# Patient Record
Sex: MALE | Race: WHITE | NOT HISPANIC OR LATINO | Employment: OTHER | URBAN - METROPOLITAN AREA
[De-identification: names, ages, dates, MRNs, and addresses within clinical notes are randomized per-mention and may not be internally consistent; named-entity substitution may affect disease eponyms.]

---

## 2021-01-01 ENCOUNTER — APPOINTMENT (OUTPATIENT)
Dept: OTHER | Facility: HOSPITAL | Age: 86
End: 2021-01-01

## 2021-01-01 ENCOUNTER — ANESTHESIA (OUTPATIENT)
Dept: PERIOP | Facility: HOSPITAL | Age: 86
End: 2021-01-01

## 2021-01-01 ENCOUNTER — APPOINTMENT (OUTPATIENT)
Dept: GENERAL RADIOLOGY | Facility: HOSPITAL | Age: 86
End: 2021-01-01

## 2021-01-01 ENCOUNTER — HOSPITAL ENCOUNTER (INPATIENT)
Facility: HOSPITAL | Age: 86
LOS: 7 days | Discharge: HOME | End: 2021-05-08
Attending: HOSPITALIST | Admitting: HOSPITALIST

## 2021-01-01 ENCOUNTER — READMISSION MANAGEMENT (OUTPATIENT)
Dept: CALL CENTER | Facility: HOSPITAL | Age: 86
End: 2021-01-01

## 2021-01-01 ENCOUNTER — HOSPITAL ENCOUNTER (INPATIENT)
Facility: HOSPITAL | Age: 86
LOS: 3 days | End: 2021-08-24
Attending: INTERNAL MEDICINE | Admitting: HOSPITALIST

## 2021-01-01 ENCOUNTER — APPOINTMENT (OUTPATIENT)
Dept: ULTRASOUND IMAGING | Facility: HOSPITAL | Age: 86
End: 2021-01-01

## 2021-01-01 ENCOUNTER — APPOINTMENT (OUTPATIENT)
Dept: CARDIOLOGY | Facility: HOSPITAL | Age: 86
End: 2021-01-01

## 2021-01-01 ENCOUNTER — ANESTHESIA EVENT (OUTPATIENT)
Dept: PERIOP | Facility: HOSPITAL | Age: 86
End: 2021-01-01

## 2021-01-01 VITALS
DIASTOLIC BLOOD PRESSURE: 92 MMHG | HEART RATE: 86 BPM | TEMPERATURE: 98.8 F | BODY MASS INDEX: 19.72 KG/M2 | RESPIRATION RATE: 17 BRPM | WEIGHT: 133.16 LBS | SYSTOLIC BLOOD PRESSURE: 162 MMHG | OXYGEN SATURATION: 91 % | HEIGHT: 69 IN

## 2021-01-01 VITALS
TEMPERATURE: 97.2 F | DIASTOLIC BLOOD PRESSURE: 87 MMHG | RESPIRATION RATE: 20 BRPM | OXYGEN SATURATION: 99 % | BODY MASS INDEX: 17.59 KG/M2 | HEIGHT: 72 IN | HEART RATE: 74 BPM | SYSTOLIC BLOOD PRESSURE: 122 MMHG | WEIGHT: 129.85 LBS

## 2021-01-01 DIAGNOSIS — Z09 FOLLOW UP: ICD-10-CM

## 2021-01-01 DIAGNOSIS — J15.8 PNEUMONIA DUE TO OTHER SPECIFIED BACTERIA (HCC): Primary | ICD-10-CM

## 2021-01-01 LAB
ALBUMIN SERPL-MCNC: 3.6 G/DL (ref 3.5–5.2)
ALBUMIN SERPL-MCNC: 4.1 G/DL (ref 3.5–5.2)
ALBUMIN/GLOB SERPL: 1.2 G/DL
ALP SERPL-CCNC: 97 U/L (ref 39–117)
ALT SERPL W P-5'-P-CCNC: 10 U/L (ref 1–41)
ANION GAP SERPL CALCULATED.3IONS-SCNC: 10 MMOL/L (ref 5–15)
ANION GAP SERPL CALCULATED.3IONS-SCNC: 10 MMOL/L (ref 5–15)
ANION GAP SERPL CALCULATED.3IONS-SCNC: 11 MMOL/L (ref 5–15)
ANION GAP SERPL CALCULATED.3IONS-SCNC: 12 MMOL/L (ref 5–15)
ANION GAP SERPL CALCULATED.3IONS-SCNC: 12 MMOL/L (ref 5–15)
ANION GAP SERPL CALCULATED.3IONS-SCNC: 13 MMOL/L (ref 5–15)
ANION GAP SERPL CALCULATED.3IONS-SCNC: 14 MMOL/L (ref 5–15)
ANION GAP SERPL CALCULATED.3IONS-SCNC: 14 MMOL/L (ref 5–15)
ANION GAP SERPL CALCULATED.3IONS-SCNC: 15 MMOL/L (ref 5–15)
ARTERIAL PATENCY WRIST A: POSITIVE
AST SERPL-CCNC: 20 U/L (ref 1–40)
ATMOSPHERIC PRESS: ABNORMAL MM[HG]
BACTERIA SPEC AEROBE CULT: NORMAL
BACTERIA UR QL AUTO: ABNORMAL /HPF
BASE EXCESS BLDA CALC-SCNC: -3 MMOL/L (ref 0–3)
BASOPHILS # BLD AUTO: 0 10*3/MM3 (ref 0–0.2)
BASOPHILS # BLD AUTO: 0.1 10*3/MM3 (ref 0–0.2)
BASOPHILS NFR BLD AUTO: 0.2 % (ref 0–1.5)
BASOPHILS NFR BLD AUTO: 0.8 % (ref 0–1.5)
BASOPHILS NFR BLD AUTO: 0.9 % (ref 0–1.5)
BASOPHILS NFR BLD AUTO: 0.9 % (ref 0–1.5)
BDY SITE: ABNORMAL
BH CV ECHO MEAS - AO ROOT AREA (BSA CORRECTED): 1.8
BH CV ECHO MEAS - AO ROOT AREA: 8.6 CM^2
BH CV ECHO MEAS - AO ROOT DIAM: 3.3 CM
BH CV ECHO MEAS - BSA(HAYCOCK): 1.8 M^2
BH CV ECHO MEAS - BSA: 1.8 M^2
BH CV ECHO MEAS - BZI_BMI: 18.9 KILOGRAMS/M^2
BH CV ECHO MEAS - BZI_METRIC_HEIGHT: 182.9 CM
BH CV ECHO MEAS - BZI_METRIC_WEIGHT: 63 KG
BH CV ECHO MEAS - EDV(CUBED): 44 ML
BH CV ECHO MEAS - EDV(MOD-SP4): 72 ML
BH CV ECHO MEAS - EDV(TEICH): 51.9 ML
BH CV ECHO MEAS - EF(CUBED): 74.7 %
BH CV ECHO MEAS - EF(MOD-BP): 46 %
BH CV ECHO MEAS - EF(MOD-SP4): 46.7 %
BH CV ECHO MEAS - EF(TEICH): 67.6 %
BH CV ECHO MEAS - ESV(CUBED): 11.1 ML
BH CV ECHO MEAS - ESV(MOD-SP4): 38.4 ML
BH CV ECHO MEAS - ESV(TEICH): 16.8 ML
BH CV ECHO MEAS - FS: 36.8 %
BH CV ECHO MEAS - IVS/LVPW: 0.77
BH CV ECHO MEAS - IVSD: 0.82 CM
BH CV ECHO MEAS - LA DIMENSION(2D): 3.1 CM
BH CV ECHO MEAS - LV DIASTOLIC VOL/BSA (35-75): 39.5 ML/M^2
BH CV ECHO MEAS - LV MASS(C)D: 95.2 GRAMS
BH CV ECHO MEAS - LV MASS(C)DI: 52.2 GRAMS/M^2
BH CV ECHO MEAS - LV SYSTOLIC VOL/BSA (12-30): 21 ML/M^2
BH CV ECHO MEAS - LVIDD: 3.5 CM
BH CV ECHO MEAS - LVIDS: 2.2 CM
BH CV ECHO MEAS - LVOT AREA: 2.3 CM^2
BH CV ECHO MEAS - LVOT DIAM: 1.7 CM
BH CV ECHO MEAS - LVPWD: 1.1 CM
BH CV ECHO MEAS - SI(CUBED): 18 ML/M^2
BH CV ECHO MEAS - SI(MOD-SP4): 18.4 ML/M^2
BH CV ECHO MEAS - SI(TEICH): 19.2 ML/M^2
BH CV ECHO MEAS - SV(CUBED): 32.9 ML
BH CV ECHO MEAS - SV(MOD-SP4): 33.6 ML
BH CV ECHO MEAS - SV(TEICH): 35.1 ML
BILIRUB SERPL-MCNC: 0.9 MG/DL (ref 0–1.2)
BILIRUB UR QL STRIP: NEGATIVE
BUN SERPL-MCNC: 16 MG/DL (ref 8–23)
BUN SERPL-MCNC: 17 MG/DL (ref 8–23)
BUN SERPL-MCNC: 20 MG/DL (ref 8–23)
BUN SERPL-MCNC: 31 MG/DL (ref 8–23)
BUN SERPL-MCNC: 32 MG/DL (ref 8–23)
BUN SERPL-MCNC: 37 MG/DL (ref 8–23)
BUN SERPL-MCNC: 40 MG/DL (ref 8–23)
BUN SERPL-MCNC: 44 MG/DL (ref 8–23)
BUN SERPL-MCNC: 46 MG/DL (ref 8–23)
BUN/CREAT SERPL: 16 (ref 7–25)
BUN/CREAT SERPL: 16.8 (ref 7–25)
BUN/CREAT SERPL: 19 (ref 7–25)
BUN/CREAT SERPL: 21.2 (ref 7–25)
BUN/CREAT SERPL: 26.4 (ref 7–25)
BUN/CREAT SERPL: 33.3 (ref 7–25)
BUN/CREAT SERPL: 35.4 (ref 7–25)
BUN/CREAT SERPL: 38.1 (ref 7–25)
BUN/CREAT SERPL: 38.7 (ref 7–25)
CA-I SERPL ISE-MCNC: 1.22 MMOL/L (ref 1.2–1.3)
CALCIUM SPEC-SCNC: 8.1 MG/DL (ref 8.6–10.5)
CALCIUM SPEC-SCNC: 8.4 MG/DL (ref 8.6–10.5)
CALCIUM SPEC-SCNC: 9.2 MG/DL (ref 8.6–10.5)
CALCIUM SPEC-SCNC: 9.2 MG/DL (ref 8.6–10.5)
CALCIUM SPEC-SCNC: 9.3 MG/DL (ref 8.6–10.5)
CALCIUM SPEC-SCNC: 9.3 MG/DL (ref 8.6–10.5)
CALCIUM SPEC-SCNC: 9.4 MG/DL (ref 8.6–10.5)
CALCIUM SPEC-SCNC: 9.4 MG/DL (ref 8.6–10.5)
CALCIUM SPEC-SCNC: 9.7 MG/DL (ref 8.6–10.5)
CHLORIDE SERPL-SCNC: 100 MMOL/L (ref 98–107)
CHLORIDE SERPL-SCNC: 101 MMOL/L (ref 98–107)
CHLORIDE SERPL-SCNC: 101 MMOL/L (ref 98–107)
CHLORIDE SERPL-SCNC: 103 MMOL/L (ref 98–107)
CHLORIDE SERPL-SCNC: 103 MMOL/L (ref 98–107)
CHLORIDE SERPL-SCNC: 104 MMOL/L (ref 98–107)
CHLORIDE SERPL-SCNC: 107 MMOL/L (ref 98–107)
CHLORIDE SERPL-SCNC: 98 MMOL/L (ref 98–107)
CHLORIDE SERPL-SCNC: 98 MMOL/L (ref 98–107)
CHOLEST SERPL-MCNC: 137 MG/DL (ref 0–200)
CLARITY UR: CLEAR
CO2 BLDA-SCNC: 24.4 MMOL/L (ref 22–29)
CO2 SERPL-SCNC: 23 MMOL/L (ref 22–29)
CO2 SERPL-SCNC: 24 MMOL/L (ref 22–29)
CO2 SERPL-SCNC: 24 MMOL/L (ref 22–29)
CO2 SERPL-SCNC: 25 MMOL/L (ref 22–29)
CO2 SERPL-SCNC: 25 MMOL/L (ref 22–29)
CO2 SERPL-SCNC: 26 MMOL/L (ref 22–29)
CO2 SERPL-SCNC: 27 MMOL/L (ref 22–29)
CO2 SERPL-SCNC: 27 MMOL/L (ref 22–29)
CO2 SERPL-SCNC: 29 MMOL/L (ref 22–29)
COLOR UR: ABNORMAL
CREAT SERPL-MCNC: 0.97 MG/DL (ref 0.76–1.27)
CREAT SERPL-MCNC: 1 MG/DL (ref 0.76–1.27)
CREAT SERPL-MCNC: 1.01 MG/DL (ref 0.76–1.27)
CREAT SERPL-MCNC: 1.05 MG/DL (ref 0.76–1.27)
CREAT SERPL-MCNC: 1.13 MG/DL (ref 0.76–1.27)
CREAT SERPL-MCNC: 1.19 MG/DL (ref 0.76–1.27)
CREAT SERPL-MCNC: 1.21 MG/DL (ref 0.76–1.27)
CREAT SERPL-MCNC: 1.32 MG/DL (ref 0.76–1.27)
CREAT SERPL-MCNC: 1.46 MG/DL (ref 0.76–1.27)
CRP SERPL-MCNC: 8.91 MG/DL (ref 0–0.5)
D DIMER PPP FEU-MCNC: 1.5 MG/L (FEU) (ref 0–0.59)
D DIMER PPP FEU-MCNC: 1.66 MG/L (FEU) (ref 0–0.59)
D-LACTATE SERPL-SCNC: 1.6 MMOL/L (ref 0.5–2)
D-LACTATE SERPL-SCNC: 1.7 MMOL/L (ref 0.5–2)
DEPRECATED RDW RBC AUTO: 40.3 FL (ref 37–54)
DEPRECATED RDW RBC AUTO: 40.7 FL (ref 37–54)
DEPRECATED RDW RBC AUTO: 48.6 FL (ref 37–54)
DEPRECATED RDW RBC AUTO: 50.3 FL (ref 37–54)
EOSINOPHIL # BLD AUTO: 0 10*3/MM3 (ref 0–0.4)
EOSINOPHIL # BLD AUTO: 0.1 10*3/MM3 (ref 0–0.4)
EOSINOPHIL NFR BLD AUTO: 0 % (ref 0.3–6.2)
EOSINOPHIL NFR BLD AUTO: 0.1 % (ref 0.3–6.2)
EOSINOPHIL NFR BLD AUTO: 0.2 % (ref 0.3–6.2)
EOSINOPHIL NFR BLD AUTO: 0.5 % (ref 0.3–6.2)
ERYTHROCYTE [DISTWIDTH] IN BLOOD BY AUTOMATED COUNT: 13.6 % (ref 12.3–15.4)
ERYTHROCYTE [DISTWIDTH] IN BLOOD BY AUTOMATED COUNT: 13.6 % (ref 12.3–15.4)
ERYTHROCYTE [DISTWIDTH] IN BLOOD BY AUTOMATED COUNT: 15.9 % (ref 12.3–15.4)
ERYTHROCYTE [DISTWIDTH] IN BLOOD BY AUTOMATED COUNT: 16.2 % (ref 12.3–15.4)
GFR SERPL CREATININE-BSD FRML MDRD: 46 ML/MIN/1.73
GFR SERPL CREATININE-BSD FRML MDRD: 52 ML/MIN/1.73
GFR SERPL CREATININE-BSD FRML MDRD: 57 ML/MIN/1.73
GFR SERPL CREATININE-BSD FRML MDRD: 58 ML/MIN/1.73
GFR SERPL CREATININE-BSD FRML MDRD: 62 ML/MIN/1.73
GFR SERPL CREATININE-BSD FRML MDRD: 67 ML/MIN/1.73
GFR SERPL CREATININE-BSD FRML MDRD: 70 ML/MIN/1.73
GFR SERPL CREATININE-BSD FRML MDRD: 71 ML/MIN/1.73
GFR SERPL CREATININE-BSD FRML MDRD: 74 ML/MIN/1.73
GLOBULIN UR ELPH-MCNC: 3.4 GM/DL
GLUCOSE BLDC GLUCOMTR-MCNC: 117 MG/DL (ref 70–105)
GLUCOSE BLDC GLUCOMTR-MCNC: 120 MG/DL (ref 70–105)
GLUCOSE BLDC GLUCOMTR-MCNC: 127 MG/DL (ref 70–105)
GLUCOSE BLDC GLUCOMTR-MCNC: 127 MG/DL (ref 70–105)
GLUCOSE BLDC GLUCOMTR-MCNC: 131 MG/DL (ref 70–105)
GLUCOSE BLDC GLUCOMTR-MCNC: 133 MG/DL (ref 70–105)
GLUCOSE BLDC GLUCOMTR-MCNC: 134 MG/DL (ref 70–105)
GLUCOSE BLDC GLUCOMTR-MCNC: 135 MG/DL (ref 70–105)
GLUCOSE BLDC GLUCOMTR-MCNC: 136 MG/DL (ref 70–105)
GLUCOSE BLDC GLUCOMTR-MCNC: 139 MG/DL (ref 70–105)
GLUCOSE BLDC GLUCOMTR-MCNC: 144 MG/DL (ref 70–105)
GLUCOSE BLDC GLUCOMTR-MCNC: 147 MG/DL (ref 70–105)
GLUCOSE BLDC GLUCOMTR-MCNC: 150 MG/DL (ref 70–105)
GLUCOSE BLDC GLUCOMTR-MCNC: 155 MG/DL (ref 70–105)
GLUCOSE BLDC GLUCOMTR-MCNC: 157 MG/DL (ref 70–105)
GLUCOSE BLDC GLUCOMTR-MCNC: 165 MG/DL (ref 70–105)
GLUCOSE BLDC GLUCOMTR-MCNC: 167 MG/DL (ref 70–105)
GLUCOSE BLDC GLUCOMTR-MCNC: 170 MG/DL (ref 70–105)
GLUCOSE BLDC GLUCOMTR-MCNC: 186 MG/DL (ref 70–105)
GLUCOSE BLDC GLUCOMTR-MCNC: 186 MG/DL (ref 70–105)
GLUCOSE BLDC GLUCOMTR-MCNC: 193 MG/DL (ref 70–105)
GLUCOSE BLDC GLUCOMTR-MCNC: 200 MG/DL (ref 70–105)
GLUCOSE BLDC GLUCOMTR-MCNC: 224 MG/DL (ref 70–105)
GLUCOSE SERPL-MCNC: 122 MG/DL (ref 65–99)
GLUCOSE SERPL-MCNC: 133 MG/DL (ref 65–99)
GLUCOSE SERPL-MCNC: 141 MG/DL (ref 65–99)
GLUCOSE SERPL-MCNC: 144 MG/DL (ref 65–99)
GLUCOSE SERPL-MCNC: 152 MG/DL (ref 65–99)
GLUCOSE SERPL-MCNC: 157 MG/DL (ref 65–99)
GLUCOSE SERPL-MCNC: 162 MG/DL (ref 65–99)
GLUCOSE SERPL-MCNC: 164 MG/DL (ref 65–99)
GLUCOSE SERPL-MCNC: 208 MG/DL (ref 65–99)
GLUCOSE UR STRIP-MCNC: NEGATIVE MG/DL
HBA1C MFR BLD: 5.5 % (ref 3.5–5.6)
HCO3 BLDA-SCNC: 23.1 MMOL/L (ref 21–28)
HCT VFR BLD AUTO: 34 % (ref 37.5–51)
HCT VFR BLD AUTO: 34.9 % (ref 37.5–51)
HCT VFR BLD AUTO: 42 % (ref 37.5–51)
HCT VFR BLD AUTO: 42.1 % (ref 37.5–51)
HDLC SERPL-MCNC: 54 MG/DL (ref 40–60)
HEMODILUTION: NO
HGB BLD-MCNC: 11.5 G/DL (ref 13–17.7)
HGB BLD-MCNC: 11.9 G/DL (ref 13–17.7)
HGB BLD-MCNC: 14.4 G/DL (ref 13–17.7)
HGB BLD-MCNC: 14.7 G/DL (ref 13–17.7)
HGB UR QL STRIP.AUTO: NEGATIVE
HYALINE CASTS UR QL AUTO: ABNORMAL /LPF
INHALED O2 CONCENTRATION: 100 %
INR PPP: 1.01 (ref 0.93–1.1)
INR PPP: 1.05 (ref 0.93–1.1)
INR PPP: 1.07 (ref 0.93–1.1)
KETONES UR QL STRIP: ABNORMAL
LDLC SERPL CALC-MCNC: 70 MG/DL (ref 0–100)
LDLC/HDLC SERPL: 1.32 {RATIO}
LEUKOCYTE ESTERASE UR QL STRIP.AUTO: NEGATIVE
LYMPHOCYTES # BLD AUTO: 0.7 10*3/MM3 (ref 0.7–3.1)
LYMPHOCYTES # BLD AUTO: 1.1 10*3/MM3 (ref 0.7–3.1)
LYMPHOCYTES # BLD AUTO: 1.4 10*3/MM3 (ref 0.7–3.1)
LYMPHOCYTES # BLD AUTO: 1.9 10*3/MM3 (ref 0.7–3.1)
LYMPHOCYTES NFR BLD AUTO: 13.4 % (ref 19.6–45.3)
LYMPHOCYTES NFR BLD AUTO: 13.6 % (ref 19.6–45.3)
LYMPHOCYTES NFR BLD AUTO: 3.3 % (ref 19.6–45.3)
LYMPHOCYTES NFR BLD AUTO: 6.8 % (ref 19.6–45.3)
MAGNESIUM SERPL-MCNC: 1.8 MG/DL (ref 1.6–2.4)
MAGNESIUM SERPL-MCNC: 1.8 MG/DL (ref 1.6–2.4)
MAGNESIUM SERPL-MCNC: 1.9 MG/DL (ref 1.6–2.4)
MAGNESIUM SERPL-MCNC: 2.3 MG/DL (ref 1.6–2.4)
MCH RBC QN AUTO: 28.6 PG (ref 26.6–33)
MCH RBC QN AUTO: 28.8 PG (ref 26.6–33)
MCH RBC QN AUTO: 29.6 PG (ref 26.6–33)
MCH RBC QN AUTO: 30.2 PG (ref 26.6–33)
MCHC RBC AUTO-ENTMCNC: 34 G/DL (ref 31.5–35.7)
MCHC RBC AUTO-ENTMCNC: 34 G/DL (ref 31.5–35.7)
MCHC RBC AUTO-ENTMCNC: 34.2 G/DL (ref 31.5–35.7)
MCHC RBC AUTO-ENTMCNC: 34.8 G/DL (ref 31.5–35.7)
MCV RBC AUTO: 82.7 FL (ref 79–97)
MCV RBC AUTO: 83.5 FL (ref 79–97)
MCV RBC AUTO: 87.2 FL (ref 79–97)
MCV RBC AUTO: 89 FL (ref 79–97)
MODALITY: ABNORMAL
MONOCYTES # BLD AUTO: 0.9 10*3/MM3 (ref 0.1–0.9)
MONOCYTES # BLD AUTO: 0.9 10*3/MM3 (ref 0.1–0.9)
MONOCYTES # BLD AUTO: 1.3 10*3/MM3 (ref 0.1–0.9)
MONOCYTES # BLD AUTO: 1.3 10*3/MM3 (ref 0.1–0.9)
MONOCYTES NFR BLD AUTO: 5.6 % (ref 5–12)
MONOCYTES NFR BLD AUTO: 6.2 % (ref 5–12)
MONOCYTES NFR BLD AUTO: 8.4 % (ref 5–12)
MONOCYTES NFR BLD AUTO: 8.9 % (ref 5–12)
NEUTROPHILS NFR BLD AUTO: 11 10*3/MM3 (ref 1.7–7)
NEUTROPHILS NFR BLD AUTO: 13.8 10*3/MM3 (ref 1.7–7)
NEUTROPHILS NFR BLD AUTO: 19.4 10*3/MM3 (ref 1.7–7)
NEUTROPHILS NFR BLD AUTO: 76.3 % (ref 42.7–76)
NEUTROPHILS NFR BLD AUTO: 77.2 % (ref 42.7–76)
NEUTROPHILS NFR BLD AUTO: 8 10*3/MM3 (ref 1.7–7)
NEUTROPHILS NFR BLD AUTO: 86.6 % (ref 42.7–76)
NEUTROPHILS NFR BLD AUTO: 90.1 % (ref 42.7–76)
NITRITE UR QL STRIP: POSITIVE
NRBC BLD AUTO-RTO: 0 /100 WBC (ref 0–0.2)
NRBC BLD AUTO-RTO: 0.2 /100 WBC (ref 0–0.2)
NT-PROBNP SERPL-MCNC: 5816 PG/ML (ref 0–1800)
NT-PROBNP SERPL-MCNC: 9810 PG/ML (ref 0–1800)
NT-PROBNP SERPL-MCNC: ABNORMAL PG/ML (ref 0–1800)
PCO2 BLDA: 44.3 MM HG (ref 35–48)
PH BLDA: 7.33 PH UNITS (ref 7.35–7.45)
PH UR STRIP.AUTO: 5.5 [PH] (ref 5–8)
PHOSPHATE SERPL-MCNC: 3.3 MG/DL (ref 2.5–4.5)
PHOSPHATE SERPL-MCNC: 3.5 MG/DL (ref 2.5–4.5)
PHOSPHATE SERPL-MCNC: 4.2 MG/DL (ref 2.5–4.5)
PLATELET # BLD AUTO: 109 10*3/MM3 (ref 140–450)
PLATELET # BLD AUTO: 130 10*3/MM3 (ref 140–450)
PLATELET # BLD AUTO: 150 10*3/MM3 (ref 140–450)
PLATELET # BLD AUTO: 197 10*3/MM3 (ref 140–450)
PMV BLD AUTO: 6.9 FL (ref 6–12)
PMV BLD AUTO: 7.1 FL (ref 6–12)
PMV BLD AUTO: 7.1 FL (ref 6–12)
PMV BLD AUTO: 7.7 FL (ref 6–12)
PO2 BLDA: 79.9 MM HG (ref 83–108)
POTASSIUM SERPL-SCNC: 3.1 MMOL/L (ref 3.5–5.2)
POTASSIUM SERPL-SCNC: 3.3 MMOL/L (ref 3.5–5.2)
POTASSIUM SERPL-SCNC: 3.4 MMOL/L (ref 3.5–5.2)
POTASSIUM SERPL-SCNC: 3.6 MMOL/L (ref 3.5–5.2)
POTASSIUM SERPL-SCNC: 3.6 MMOL/L (ref 3.5–5.2)
POTASSIUM SERPL-SCNC: 3.9 MMOL/L (ref 3.5–5.2)
POTASSIUM SERPL-SCNC: 4.2 MMOL/L (ref 3.5–5.2)
POTASSIUM SERPL-SCNC: 4.3 MMOL/L (ref 3.5–5.2)
POTASSIUM SERPL-SCNC: 4.5 MMOL/L (ref 3.5–5.2)
POTASSIUM SERPL-SCNC: 5.1 MMOL/L (ref 3.5–5.2)
PROCALCITONIN SERPL-MCNC: 0.51 NG/ML (ref 0–0.25)
PROT SERPL-MCNC: 7.5 G/DL (ref 6–8.5)
PROT UR QL STRIP: NEGATIVE
PROTHROMBIN TIME: 11.1 SECONDS (ref 9.6–11.7)
PROTHROMBIN TIME: 11.6 SECONDS (ref 9.6–11.7)
PROTHROMBIN TIME: 11.8 SECONDS (ref 9.6–11.7)
QT INTERVAL: 368 MS
QT INTERVAL: 426 MS
RBC # BLD AUTO: 3.82 10*6/MM3 (ref 4.14–5.8)
RBC # BLD AUTO: 4 10*6/MM3 (ref 4.14–5.8)
RBC # BLD AUTO: 5.03 10*6/MM3 (ref 4.14–5.8)
RBC # BLD AUTO: 5.1 10*6/MM3 (ref 4.14–5.8)
RBC # UR: ABNORMAL /HPF
REF LAB TEST METHOD: ABNORMAL
SAO2 % BLDCOA: 94.7 % (ref 94–98)
SARS-COV-2 RNA PNL SPEC NAA+PROBE: DETECTED
SARS-COV-2 RNA PNL SPEC NAA+PROBE: NOT DETECTED
SODIUM SERPL-SCNC: 137 MMOL/L (ref 136–145)
SODIUM SERPL-SCNC: 139 MMOL/L (ref 136–145)
SODIUM SERPL-SCNC: 140 MMOL/L (ref 136–145)
SODIUM SERPL-SCNC: 141 MMOL/L (ref 136–145)
SODIUM SERPL-SCNC: 141 MMOL/L (ref 136–145)
SODIUM SERPL-SCNC: 142 MMOL/L (ref 136–145)
SODIUM SERPL-SCNC: 142 MMOL/L (ref 136–145)
SODIUM UR-SCNC: 28 MMOL/L
SP GR UR STRIP: 1.02 (ref 1–1.03)
SQUAMOUS #/AREA URNS HPF: ABNORMAL /HPF
TRIGL SERPL-MCNC: 59 MG/DL (ref 0–150)
TROPONIN T SERPL-MCNC: 0.03 NG/ML (ref 0–0.03)
TROPONIN T SERPL-MCNC: 0.07 NG/ML (ref 0–0.03)
TROPONIN T SERPL-MCNC: 0.07 NG/ML (ref 0–0.03)
TROPONIN T SERPL-MCNC: 0.18 NG/ML (ref 0–0.03)
TROPONIN T SERPL-MCNC: 0.21 NG/ML (ref 0–0.03)
TROPONIN T SERPL-MCNC: 0.23 NG/ML (ref 0–0.03)
TROPONIN T SERPL-MCNC: 0.29 NG/ML (ref 0–0.03)
TROPONIN T SERPL-MCNC: 0.31 NG/ML (ref 0–0.03)
TROPONIN T SERPL-MCNC: 0.31 NG/ML (ref 0–0.03)
UROBILINOGEN UR QL STRIP: ABNORMAL
VLDLC SERPL-MCNC: 13 MG/DL (ref 5–40)
WBC # BLD AUTO: 10.4 10*3/MM3 (ref 3.4–10.8)
WBC # BLD AUTO: 14.4 10*3/MM3 (ref 3.4–10.8)
WBC # BLD AUTO: 15.9 10*3/MM3 (ref 3.4–10.8)
WBC # BLD AUTO: 21.5 10*3/MM3 (ref 3.4–10.8)
WBC UR QL AUTO: ABNORMAL /HPF
WHOLE BLOOD HOLD SPECIMEN: NORMAL
WHOLE BLOOD HOLD SPECIMEN: NORMAL

## 2021-01-01 PROCEDURE — 94799 UNLISTED PULMONARY SVC/PX: CPT

## 2021-01-01 PROCEDURE — 84484 ASSAY OF TROPONIN QUANT: CPT | Performed by: STUDENT IN AN ORGANIZED HEALTH CARE EDUCATION/TRAINING PROGRAM

## 2021-01-01 PROCEDURE — 84145 PROCALCITONIN (PCT): CPT | Performed by: HOSPITALIST

## 2021-01-01 PROCEDURE — 80048 BASIC METABOLIC PNL TOTAL CA: CPT | Performed by: NURSE PRACTITIONER

## 2021-01-01 PROCEDURE — 76775 US EXAM ABDO BACK WALL LIM: CPT

## 2021-01-01 PROCEDURE — 83735 ASSAY OF MAGNESIUM: CPT | Performed by: NURSE PRACTITIONER

## 2021-01-01 PROCEDURE — 25010000002 CEFTRIAXONE PER 250 MG: Performed by: NURSE PRACTITIONER

## 2021-01-01 PROCEDURE — 99232 SBSQ HOSP IP/OBS MODERATE 35: CPT | Performed by: HOSPITALIST

## 2021-01-01 PROCEDURE — C1776 JOINT DEVICE (IMPLANTABLE): HCPCS | Performed by: ORTHOPAEDIC SURGERY

## 2021-01-01 PROCEDURE — 25010000002 ENOXAPARIN PER 10 MG: Performed by: NURSE PRACTITIONER

## 2021-01-01 PROCEDURE — 80048 BASIC METABOLIC PNL TOTAL CA: CPT | Performed by: ORTHOPAEDIC SURGERY

## 2021-01-01 PROCEDURE — 25010000003 POTASSIUM CHLORIDE 10 MEQ/100ML SOLUTION: Performed by: HOSPITALIST

## 2021-01-01 PROCEDURE — 92610 EVALUATE SWALLOWING FUNCTION: CPT

## 2021-01-01 PROCEDURE — 0SR90J9 REPLACEMENT OF RIGHT HIP JOINT WITH SYNTHETIC SUBSTITUTE, CEMENTED, OPEN APPROACH: ICD-10-PCS | Performed by: ORTHOPAEDIC SURGERY

## 2021-01-01 PROCEDURE — 85025 COMPLETE CBC W/AUTO DIFF WBC: CPT | Performed by: NURSE PRACTITIONER

## 2021-01-01 PROCEDURE — 85379 FIBRIN DEGRADATION QUANT: CPT | Performed by: HOSPITALIST

## 2021-01-01 PROCEDURE — 99233 SBSQ HOSP IP/OBS HIGH 50: CPT | Performed by: HOSPITALIST

## 2021-01-01 PROCEDURE — 99232 SBSQ HOSP IP/OBS MODERATE 35: CPT | Performed by: INTERNAL MEDICINE

## 2021-01-01 PROCEDURE — 74018 RADEX ABDOMEN 1 VIEW: CPT

## 2021-01-01 PROCEDURE — 72170 X-RAY EXAM OF PELVIS: CPT

## 2021-01-01 PROCEDURE — 97530 THERAPEUTIC ACTIVITIES: CPT

## 2021-01-01 PROCEDURE — 71045 X-RAY EXAM CHEST 1 VIEW: CPT

## 2021-01-01 PROCEDURE — 94664 DEMO&/EVAL PT USE INHALER: CPT

## 2021-01-01 PROCEDURE — 85610 PROTHROMBIN TIME: CPT | Performed by: NURSE PRACTITIONER

## 2021-01-01 PROCEDURE — 80048 BASIC METABOLIC PNL TOTAL CA: CPT | Performed by: INTERNAL MEDICINE

## 2021-01-01 PROCEDURE — 82962 GLUCOSE BLOOD TEST: CPT

## 2021-01-01 PROCEDURE — 84100 ASSAY OF PHOSPHORUS: CPT | Performed by: ORTHOPAEDIC SURGERY

## 2021-01-01 PROCEDURE — 82330 ASSAY OF CALCIUM: CPT | Performed by: NURSE PRACTITIONER

## 2021-01-01 PROCEDURE — 83880 ASSAY OF NATRIURETIC PEPTIDE: CPT | Performed by: NURSE PRACTITIONER

## 2021-01-01 PROCEDURE — 84484 ASSAY OF TROPONIN QUANT: CPT | Performed by: NURSE PRACTITIONER

## 2021-01-01 PROCEDURE — 81001 URINALYSIS AUTO W/SCOPE: CPT | Performed by: NURSE PRACTITIONER

## 2021-01-01 PROCEDURE — 84132 ASSAY OF SERUM POTASSIUM: CPT | Performed by: HOSPITALIST

## 2021-01-01 PROCEDURE — 97112 NEUROMUSCULAR REEDUCATION: CPT

## 2021-01-01 PROCEDURE — 99238 HOSP IP/OBS DSCHRG MGMT 30/<: CPT | Performed by: HOSPITALIST

## 2021-01-01 PROCEDURE — 36600 WITHDRAWAL OF ARTERIAL BLOOD: CPT

## 2021-01-01 PROCEDURE — 93325 DOPPLER ECHO COLOR FLOW MAPG: CPT | Performed by: INTERNAL MEDICINE

## 2021-01-01 PROCEDURE — 93005 ELECTROCARDIOGRAM TRACING: CPT | Performed by: NURSE PRACTITIONER

## 2021-01-01 PROCEDURE — 99222 1ST HOSP IP/OBS MODERATE 55: CPT | Performed by: NURSE PRACTITIONER

## 2021-01-01 PROCEDURE — 86140 C-REACTIVE PROTEIN: CPT | Performed by: HOSPITALIST

## 2021-01-01 PROCEDURE — 80061 LIPID PANEL: CPT | Performed by: NURSE PRACTITIONER

## 2021-01-01 PROCEDURE — 63710000001 ONDANSETRON PER 8 MG: Performed by: NURSE PRACTITIONER

## 2021-01-01 PROCEDURE — 25010000003 CEFAZOLIN PER 500 MG: Performed by: ANESTHESIOLOGY

## 2021-01-01 PROCEDURE — U0003 INFECTIOUS AGENT DETECTION BY NUCLEIC ACID (DNA OR RNA); SEVERE ACUTE RESPIRATORY SYNDROME CORONAVIRUS 2 (SARS-COV-2) (CORONAVIRUS DISEASE [COVID-19]), AMPLIFIED PROBE TECHNIQUE, MAKING USE OF HIGH THROUGHPUT TECHNOLOGIES AS DESCRIBED BY CMS-2020-01-R: HCPCS | Performed by: NURSE PRACTITIONER

## 2021-01-01 PROCEDURE — 92526 ORAL FUNCTION THERAPY: CPT

## 2021-01-01 PROCEDURE — 83605 ASSAY OF LACTIC ACID: CPT | Performed by: NURSE PRACTITIONER

## 2021-01-01 PROCEDURE — 25010000002 FUROSEMIDE PER 20 MG: Performed by: NURSE PRACTITIONER

## 2021-01-01 PROCEDURE — 94640 AIRWAY INHALATION TREATMENT: CPT

## 2021-01-01 PROCEDURE — 99232 SBSQ HOSP IP/OBS MODERATE 35: CPT | Performed by: STUDENT IN AN ORGANIZED HEALTH CARE EDUCATION/TRAINING PROGRAM

## 2021-01-01 PROCEDURE — 99222 1ST HOSP IP/OBS MODERATE 55: CPT | Performed by: INTERNAL MEDICINE

## 2021-01-01 PROCEDURE — 85610 PROTHROMBIN TIME: CPT | Performed by: ORTHOPAEDIC SURGERY

## 2021-01-01 PROCEDURE — 63710000001 INSULIN LISPRO (HUMAN) PER 5 UNITS: Performed by: NURSE PRACTITIONER

## 2021-01-01 PROCEDURE — 87040 BLOOD CULTURE FOR BACTERIA: CPT | Performed by: NURSE PRACTITIONER

## 2021-01-01 PROCEDURE — 97162 PT EVAL MOD COMPLEX 30 MIN: CPT

## 2021-01-01 PROCEDURE — 83735 ASSAY OF MAGNESIUM: CPT | Performed by: ORTHOPAEDIC SURGERY

## 2021-01-01 PROCEDURE — 99233 SBSQ HOSP IP/OBS HIGH 50: CPT | Performed by: NURSE PRACTITIONER

## 2021-01-01 PROCEDURE — 93308 TTE F-UP OR LMTD: CPT

## 2021-01-01 PROCEDURE — 25010000002 ONDANSETRON PER 1 MG: Performed by: ANESTHESIOLOGY

## 2021-01-01 PROCEDURE — 93325 DOPPLER ECHO COLOR FLOW MAPG: CPT

## 2021-01-01 PROCEDURE — 25010000002 FENTANYL CITRATE (PF) 100 MCG/2ML SOLUTION: Performed by: ANESTHESIOLOGY

## 2021-01-01 PROCEDURE — 85379 FIBRIN DEGRADATION QUANT: CPT | Performed by: INTERNAL MEDICINE

## 2021-01-01 PROCEDURE — 92611 MOTION FLUOROSCOPY/SWALLOW: CPT

## 2021-01-01 PROCEDURE — 99221 1ST HOSP IP/OBS SF/LOW 40: CPT | Performed by: NURSE PRACTITIONER

## 2021-01-01 PROCEDURE — 94760 N-INVAS EAR/PLS OXIMETRY 1: CPT

## 2021-01-01 PROCEDURE — 25010000002 KETOROLAC TROMETHAMINE PER 15 MG: Performed by: NURSE PRACTITIONER

## 2021-01-01 PROCEDURE — 93005 ELECTROCARDIOGRAM TRACING: CPT | Performed by: STUDENT IN AN ORGANIZED HEALTH CARE EDUCATION/TRAINING PROGRAM

## 2021-01-01 PROCEDURE — 73502 X-RAY EXAM HIP UNI 2-3 VIEWS: CPT

## 2021-01-01 PROCEDURE — 82803 BLOOD GASES ANY COMBINATION: CPT

## 2021-01-01 PROCEDURE — 99231 SBSQ HOSP IP/OBS SF/LOW 25: CPT | Performed by: NURSE PRACTITIONER

## 2021-01-01 PROCEDURE — 25010000002 ROPIVACAINE PER 1 MG: Performed by: ORTHOPAEDIC SURGERY

## 2021-01-01 PROCEDURE — 74230 X-RAY XM SWLNG FUNCJ C+: CPT

## 2021-01-01 PROCEDURE — 97166 OT EVAL MOD COMPLEX 45 MIN: CPT

## 2021-01-01 PROCEDURE — U0003 INFECTIOUS AGENT DETECTION BY NUCLEIC ACID (DNA OR RNA); SEVERE ACUTE RESPIRATORY SYNDROME CORONAVIRUS 2 (SARS-COV-2) (CORONAVIRUS DISEASE [COVID-19]), AMPLIFIED PROBE TECHNIQUE, MAKING USE OF HIGH THROUGHPUT TECHNOLOGIES AS DESCRIBED BY CMS-2020-01-R: HCPCS | Performed by: ORTHOPAEDIC SURGERY

## 2021-01-01 PROCEDURE — 99239 HOSP IP/OBS DSCHRG MGMT >30: CPT | Performed by: HOSPITALIST

## 2021-01-01 PROCEDURE — 80053 COMPREHEN METABOLIC PANEL: CPT | Performed by: NURSE PRACTITIONER

## 2021-01-01 PROCEDURE — 84100 ASSAY OF PHOSPHORUS: CPT | Performed by: NURSE PRACTITIONER

## 2021-01-01 PROCEDURE — 25010000002 PROPOFOL 10 MG/ML EMULSION: Performed by: ANESTHESIOLOGY

## 2021-01-01 PROCEDURE — 93308 TTE F-UP OR LMTD: CPT | Performed by: INTERNAL MEDICINE

## 2021-01-01 PROCEDURE — 25010000002 CEFAZOLIN PER 500 MG: Performed by: ORTHOPAEDIC SURGERY

## 2021-01-01 PROCEDURE — 85025 COMPLETE CBC W/AUTO DIFF WBC: CPT | Performed by: ORTHOPAEDIC SURGERY

## 2021-01-01 PROCEDURE — 80069 RENAL FUNCTION PANEL: CPT | Performed by: INTERNAL MEDICINE

## 2021-01-01 PROCEDURE — 97535 SELF CARE MNGMENT TRAINING: CPT

## 2021-01-01 PROCEDURE — 25010000002 EPINEPHRINE PER 0.1 MG: Performed by: ORTHOPAEDIC SURGERY

## 2021-01-01 PROCEDURE — 63710000001 PREDNISONE PER 1 MG: Performed by: INTERNAL MEDICINE

## 2021-01-01 PROCEDURE — 93321 DOPPLER ECHO F-UP/LMTD STD: CPT | Performed by: INTERNAL MEDICINE

## 2021-01-01 PROCEDURE — 85025 COMPLETE CBC W/AUTO DIFF WBC: CPT | Performed by: HOSPITALIST

## 2021-01-01 PROCEDURE — 83036 HEMOGLOBIN GLYCOSYLATED A1C: CPT | Performed by: NURSE PRACTITIONER

## 2021-01-01 PROCEDURE — 84300 ASSAY OF URINE SODIUM: CPT | Performed by: INTERNAL MEDICINE

## 2021-01-01 PROCEDURE — 93321 DOPPLER ECHO F-UP/LMTD STD: CPT

## 2021-01-01 DEVICE — IMPLANTABLE DEVICE: Type: IMPLANTABLE DEVICE | Site: HIP | Status: FUNCTIONAL

## 2021-01-01 DEVICE — PRT HIP UNI/BIPOL STRYKER: Type: IMPLANTABLE DEVICE | Site: HIP | Status: FUNCTIONAL

## 2021-01-01 RX ORDER — NITROGLYCERIN 0.4 MG/1
0.4 TABLET SUBLINGUAL
Status: DISCONTINUED | OUTPATIENT
Start: 2021-01-01 | End: 2021-01-01 | Stop reason: HOSPADM

## 2021-01-01 RX ORDER — CLOPIDOGREL BISULFATE 75 MG/1
75 TABLET ORAL DAILY
Status: DISCONTINUED | OUTPATIENT
Start: 2021-01-01 | End: 2021-01-01 | Stop reason: HOSPADM

## 2021-01-01 RX ORDER — NALOXONE HCL 0.4 MG/ML
0.4 VIAL (ML) INJECTION
Status: DISCONTINUED | OUTPATIENT
Start: 2021-01-01 | End: 2021-01-01 | Stop reason: HOSPADM

## 2021-01-01 RX ORDER — HYDROCODONE BITARTRATE AND ACETAMINOPHEN 5; 325 MG/1; MG/1
2 TABLET ORAL EVERY 4 HOURS PRN
Status: DISCONTINUED | OUTPATIENT
Start: 2021-01-01 | End: 2021-01-01 | Stop reason: HOSPADM

## 2021-01-01 RX ORDER — MAGNESIUM SULFATE HEPTAHYDRATE 40 MG/ML
4 INJECTION, SOLUTION INTRAVENOUS AS NEEDED
Status: DISCONTINUED | OUTPATIENT
Start: 2021-01-01 | End: 2021-01-01 | Stop reason: HOSPADM

## 2021-01-01 RX ORDER — ISOSORBIDE MONONITRATE 30 MG/1
30 TABLET, EXTENDED RELEASE ORAL
Qty: 30 TABLET | Refills: 0 | Status: SHIPPED | OUTPATIENT
Start: 2021-01-01

## 2021-01-01 RX ORDER — SODIUM CHLORIDE 0.9 % (FLUSH) 0.9 %
3 SYRINGE (ML) INJECTION EVERY 12 HOURS SCHEDULED
Status: DISCONTINUED | OUTPATIENT
Start: 2021-01-01 | End: 2021-01-01 | Stop reason: HOSPADM

## 2021-01-01 RX ORDER — ACETAMINOPHEN 325 MG/1
650 TABLET ORAL EVERY 4 HOURS PRN
Status: DISCONTINUED | OUTPATIENT
Start: 2021-01-01 | End: 2021-01-01 | Stop reason: HOSPADM

## 2021-01-01 RX ORDER — IPRATROPIUM BROMIDE AND ALBUTEROL SULFATE 2.5; .5 MG/3ML; MG/3ML
3 SOLUTION RESPIRATORY (INHALATION) EVERY 6 HOURS PRN
Status: DISCONTINUED | OUTPATIENT
Start: 2021-01-01 | End: 2021-01-01

## 2021-01-01 RX ORDER — TAMSULOSIN HYDROCHLORIDE 0.4 MG/1
0.4 CAPSULE ORAL DAILY
Status: DISCONTINUED | OUTPATIENT
Start: 2021-01-01 | End: 2021-01-01 | Stop reason: HOSPADM

## 2021-01-01 RX ORDER — PREDNISONE 20 MG/1
TABLET ORAL
Qty: 25 TABLET | Refills: 0 | Status: SHIPPED | OUTPATIENT
Start: 2021-01-01 | End: 2021-01-01

## 2021-01-01 RX ORDER — METOPROLOL SUCCINATE 50 MG/1
50 TABLET, EXTENDED RELEASE ORAL
Qty: 30 TABLET | Refills: 0 | Status: SHIPPED | OUTPATIENT
Start: 2021-01-01

## 2021-01-01 RX ORDER — CEFAZOLIN SODIUM 1 G/3ML
INJECTION, POWDER, FOR SOLUTION INTRAMUSCULAR; INTRAVENOUS AS NEEDED
Status: DISCONTINUED | OUTPATIENT
Start: 2021-01-01 | End: 2021-01-01 | Stop reason: SURG

## 2021-01-01 RX ORDER — ACETAMINOPHEN 650 MG/1
650 SUPPOSITORY RECTAL EVERY 4 HOURS PRN
Status: DISCONTINUED | OUTPATIENT
Start: 2021-01-01 | End: 2021-01-01 | Stop reason: HOSPADM

## 2021-01-01 RX ORDER — FUROSEMIDE 40 MG/1
40 TABLET ORAL DAILY
Qty: 30 TABLET | Refills: 0 | Status: SHIPPED | OUTPATIENT
Start: 2021-01-01

## 2021-01-01 RX ORDER — SODIUM CHLORIDE 9 MG/ML
INJECTION, SOLUTION INTRAVENOUS CONTINUOUS PRN
Status: DISCONTINUED | OUTPATIENT
Start: 2021-01-01 | End: 2021-01-01 | Stop reason: SURG

## 2021-01-01 RX ORDER — SODIUM CHLORIDE 0.9 % (FLUSH) 0.9 %
3-10 SYRINGE (ML) INJECTION AS NEEDED
Status: DISCONTINUED | OUTPATIENT
Start: 2021-01-01 | End: 2021-01-01 | Stop reason: HOSPADM

## 2021-01-01 RX ORDER — POTASSIUM CHLORIDE 20 MEQ/1
20 TABLET, EXTENDED RELEASE ORAL DAILY
Status: DISCONTINUED | OUTPATIENT
Start: 2021-01-01 | End: 2021-01-01 | Stop reason: HOSPADM

## 2021-01-01 RX ORDER — TRANEXAMIC ACID 10 MG/ML
1000 INJECTION, SOLUTION INTRAVENOUS ONCE
Status: DISCONTINUED | OUTPATIENT
Start: 2021-01-01 | End: 2021-01-01 | Stop reason: HOSPADM

## 2021-01-01 RX ORDER — EPINEPHRINE 1 MG/ML
INJECTION, SOLUTION, CONCENTRATE INTRAVENOUS AS NEEDED
Status: DISCONTINUED | OUTPATIENT
Start: 2021-01-01 | End: 2021-01-01 | Stop reason: HOSPADM

## 2021-01-01 RX ORDER — PROPOFOL 10 MG/ML
VIAL (ML) INTRAVENOUS AS NEEDED
Status: DISCONTINUED | OUTPATIENT
Start: 2021-01-01 | End: 2021-01-01 | Stop reason: SURG

## 2021-01-01 RX ORDER — LISINOPRIL 5 MG/1
2.5 TABLET ORAL
Status: DISCONTINUED | OUTPATIENT
Start: 2021-01-01 | End: 2021-01-01

## 2021-01-01 RX ORDER — BISACODYL 10 MG
10 SUPPOSITORY, RECTAL RECTAL DAILY
Status: DISCONTINUED | OUTPATIENT
Start: 2021-01-01 | End: 2021-01-01 | Stop reason: HOSPADM

## 2021-01-01 RX ORDER — IPRATROPIUM BROMIDE AND ALBUTEROL SULFATE 2.5; .5 MG/3ML; MG/3ML
3 SOLUTION RESPIRATORY (INHALATION)
Status: DISCONTINUED | OUTPATIENT
Start: 2021-01-01 | End: 2021-01-01 | Stop reason: ALTCHOICE

## 2021-01-01 RX ORDER — METOPROLOL SUCCINATE 50 MG/1
50 TABLET, EXTENDED RELEASE ORAL
Status: DISCONTINUED | OUTPATIENT
Start: 2021-01-01 | End: 2021-01-01 | Stop reason: HOSPADM

## 2021-01-01 RX ORDER — PREDNISONE 20 MG/1
20 TABLET ORAL 2 TIMES DAILY WITH MEALS
Status: DISCONTINUED | OUTPATIENT
Start: 2021-01-01 | End: 2021-01-01 | Stop reason: HOSPADM

## 2021-01-01 RX ORDER — MORPHINE SULFATE 4 MG/ML
2 INJECTION, SOLUTION INTRAMUSCULAR; INTRAVENOUS
Status: DISCONTINUED | OUTPATIENT
Start: 2021-01-01 | End: 2021-01-01 | Stop reason: HOSPADM

## 2021-01-01 RX ORDER — FUROSEMIDE 40 MG/1
40 TABLET ORAL DAILY
Status: DISCONTINUED | OUTPATIENT
Start: 2021-01-01 | End: 2021-01-01 | Stop reason: HOSPADM

## 2021-01-01 RX ORDER — LIDOCAINE HYDROCHLORIDE 20 MG/ML
INJECTION, SOLUTION EPIDURAL; INFILTRATION; INTRACAUDAL; PERINEURAL AS NEEDED
Status: DISCONTINUED | OUTPATIENT
Start: 2021-01-01 | End: 2021-01-01 | Stop reason: SURG

## 2021-01-01 RX ORDER — ONDANSETRON 2 MG/ML
INJECTION INTRAMUSCULAR; INTRAVENOUS AS NEEDED
Status: DISCONTINUED | OUTPATIENT
Start: 2021-01-01 | End: 2021-01-01 | Stop reason: SURG

## 2021-01-01 RX ORDER — LISINOPRIL 20 MG/1
40 TABLET ORAL DAILY
Status: DISCONTINUED | OUTPATIENT
Start: 2021-01-01 | End: 2021-01-01

## 2021-01-01 RX ORDER — LISINOPRIL 5 MG/1
10 TABLET ORAL
Status: DISCONTINUED | OUTPATIENT
Start: 2021-01-01 | End: 2021-01-01

## 2021-01-01 RX ORDER — BUDESONIDE AND FORMOTEROL FUMARATE DIHYDRATE 160; 4.5 UG/1; UG/1
2 AEROSOL RESPIRATORY (INHALATION)
Status: DISCONTINUED | OUTPATIENT
Start: 2021-01-01 | End: 2021-01-01 | Stop reason: HOSPADM

## 2021-01-01 RX ORDER — ROPIVACAINE HYDROCHLORIDE 5 MG/ML
INJECTION, SOLUTION EPIDURAL; INFILTRATION; PERINEURAL AS NEEDED
Status: DISCONTINUED | OUTPATIENT
Start: 2021-01-01 | End: 2021-01-01 | Stop reason: HOSPADM

## 2021-01-01 RX ORDER — SODIUM CHLORIDE 9 MG/ML
125 INJECTION, SOLUTION INTRAVENOUS CONTINUOUS
Status: DISCONTINUED | OUTPATIENT
Start: 2021-01-01 | End: 2021-01-01 | Stop reason: HOSPADM

## 2021-01-01 RX ORDER — BUDESONIDE 0.5 MG/2ML
0.5 INHALANT ORAL
Status: DISCONTINUED | OUTPATIENT
Start: 2021-01-01 | End: 2021-01-01 | Stop reason: ALTCHOICE

## 2021-01-01 RX ORDER — ISOSORBIDE MONONITRATE 30 MG/1
30 TABLET, EXTENDED RELEASE ORAL
Status: DISCONTINUED | OUTPATIENT
Start: 2021-01-01 | End: 2021-01-01 | Stop reason: HOSPADM

## 2021-01-01 RX ORDER — INSULIN LISPRO 100 [IU]/ML
0-7 INJECTION, SOLUTION INTRAVENOUS; SUBCUTANEOUS
Status: DISCONTINUED | OUTPATIENT
Start: 2021-01-01 | End: 2021-01-01 | Stop reason: HOSPADM

## 2021-01-01 RX ORDER — LISINOPRIL 5 MG/1
10 TABLET ORAL EVERY 12 HOURS SCHEDULED
Status: DISCONTINUED | OUTPATIENT
Start: 2021-01-01 | End: 2021-01-01 | Stop reason: HOSPADM

## 2021-01-01 RX ORDER — CLOPIDOGREL BISULFATE 75 MG/1
75 TABLET ORAL DAILY
Qty: 30 TABLET | Refills: 0 | Status: SHIPPED | OUTPATIENT
Start: 2021-01-01

## 2021-01-01 RX ORDER — METOPROLOL SUCCINATE 25 MG/1
25 TABLET, EXTENDED RELEASE ORAL
Status: DISCONTINUED | OUTPATIENT
Start: 2021-01-01 | End: 2021-01-01

## 2021-01-01 RX ORDER — MAGNESIUM SULFATE HEPTAHYDRATE 40 MG/ML
2 INJECTION, SOLUTION INTRAVENOUS AS NEEDED
Status: DISCONTINUED | OUTPATIENT
Start: 2021-01-01 | End: 2021-01-01 | Stop reason: HOSPADM

## 2021-01-01 RX ORDER — ASPIRIN 81 MG/1
81 TABLET, CHEWABLE ORAL DAILY
Status: DISCONTINUED | OUTPATIENT
Start: 2021-01-01 | End: 2021-01-01 | Stop reason: HOSPADM

## 2021-01-01 RX ORDER — POTASSIUM CHLORIDE 20 MEQ/1
40 TABLET, EXTENDED RELEASE ORAL ONCE
Status: COMPLETED | OUTPATIENT
Start: 2021-01-01 | End: 2021-01-01

## 2021-01-01 RX ORDER — ONDANSETRON 2 MG/ML
4 INJECTION INTRAMUSCULAR; INTRAVENOUS EVERY 6 HOURS PRN
Status: DISCONTINUED | OUTPATIENT
Start: 2021-01-01 | End: 2021-01-01 | Stop reason: HOSPADM

## 2021-01-01 RX ORDER — TAMSULOSIN HYDROCHLORIDE 0.4 MG/1
0.4 CAPSULE ORAL DAILY
Qty: 30 CAPSULE | Refills: 0 | Status: SHIPPED | OUTPATIENT
Start: 2021-01-01

## 2021-01-01 RX ORDER — KETOROLAC TROMETHAMINE 15 MG/ML
15 INJECTION, SOLUTION INTRAMUSCULAR; INTRAVENOUS EVERY 6 HOURS PRN
Status: DISPENSED | OUTPATIENT
Start: 2021-01-01 | End: 2021-01-01

## 2021-01-01 RX ORDER — ONDANSETRON 2 MG/ML
4 INJECTION INTRAMUSCULAR; INTRAVENOUS EVERY 6 HOURS PRN
Status: DISCONTINUED | OUTPATIENT
Start: 2021-01-01 | End: 2021-01-01

## 2021-01-01 RX ORDER — ACETAMINOPHEN 160 MG/5ML
650 SOLUTION ORAL EVERY 4 HOURS PRN
Status: DISCONTINUED | OUTPATIENT
Start: 2021-01-01 | End: 2021-01-01 | Stop reason: HOSPADM

## 2021-01-01 RX ORDER — NITROGLYCERIN 0.4 MG/1
0.4 TABLET SUBLINGUAL
Qty: 30 TABLET | Refills: 0 | Status: SHIPPED | OUTPATIENT
Start: 2021-01-01

## 2021-01-01 RX ORDER — POTASSIUM CHLORIDE 20 MEQ/1
20 TABLET, EXTENDED RELEASE ORAL DAILY
Qty: 30 TABLET | Refills: 0 | Status: SHIPPED | OUTPATIENT
Start: 2021-01-01

## 2021-01-01 RX ORDER — ROCURONIUM BROMIDE 10 MG/ML
INJECTION, SOLUTION INTRAVENOUS AS NEEDED
Status: DISCONTINUED | OUTPATIENT
Start: 2021-01-01 | End: 2021-01-01 | Stop reason: SURG

## 2021-01-01 RX ORDER — INSULIN LISPRO 100 [IU]/ML
0-7 INJECTION, SOLUTION INTRAVENOUS; SUBCUTANEOUS AS NEEDED
Status: DISCONTINUED | OUTPATIENT
Start: 2021-01-01 | End: 2021-01-01 | Stop reason: HOSPADM

## 2021-01-01 RX ORDER — DEXTROSE MONOHYDRATE 25 G/50ML
25 INJECTION, SOLUTION INTRAVENOUS
Status: DISCONTINUED | OUTPATIENT
Start: 2021-01-01 | End: 2021-01-01 | Stop reason: HOSPADM

## 2021-01-01 RX ORDER — BUDESONIDE AND FORMOTEROL FUMARATE DIHYDRATE 160; 4.5 UG/1; UG/1
2 AEROSOL RESPIRATORY (INHALATION)
Qty: 1 EACH | Refills: 0 | Status: ON HOLD | OUTPATIENT
Start: 2021-01-01 | End: 2021-01-01

## 2021-01-01 RX ORDER — NICOTINE POLACRILEX 4 MG
15 LOZENGE BUCCAL
Status: DISCONTINUED | OUTPATIENT
Start: 2021-01-01 | End: 2021-01-01 | Stop reason: HOSPADM

## 2021-01-01 RX ORDER — TRANEXAMIC ACID 100 MG/ML
INJECTION, SOLUTION INTRAVENOUS AS NEEDED
Status: DISCONTINUED | OUTPATIENT
Start: 2021-01-01 | End: 2021-01-01 | Stop reason: SURG

## 2021-01-01 RX ORDER — SODIUM CHLORIDE 0.9 % (FLUSH) 0.9 %
10 SYRINGE (ML) INJECTION EVERY 12 HOURS SCHEDULED
Status: DISCONTINUED | OUTPATIENT
Start: 2021-01-01 | End: 2021-01-01 | Stop reason: HOSPADM

## 2021-01-01 RX ORDER — CHOLECALCIFEROL (VITAMIN D3) 125 MCG
5 CAPSULE ORAL NIGHTLY PRN
Status: DISCONTINUED | OUTPATIENT
Start: 2021-01-01 | End: 2021-01-01 | Stop reason: HOSPADM

## 2021-01-01 RX ORDER — LISINOPRIL 40 MG/1
40 TABLET ORAL DAILY
COMMUNITY

## 2021-01-01 RX ORDER — TRAMADOL HYDROCHLORIDE 50 MG/1
50 TABLET ORAL EVERY 6 HOURS PRN
Status: DISCONTINUED | OUTPATIENT
Start: 2021-01-01 | End: 2021-01-01 | Stop reason: HOSPADM

## 2021-01-01 RX ORDER — ALUMINA, MAGNESIA, AND SIMETHICONE 2400; 2400; 240 MG/30ML; MG/30ML; MG/30ML
15 SUSPENSION ORAL EVERY 6 HOURS PRN
Status: DISCONTINUED | OUTPATIENT
Start: 2021-01-01 | End: 2021-01-01 | Stop reason: HOSPADM

## 2021-01-01 RX ORDER — ONDANSETRON 4 MG/1
4 TABLET, FILM COATED ORAL EVERY 6 HOURS PRN
Status: DISCONTINUED | OUTPATIENT
Start: 2021-01-01 | End: 2021-01-01 | Stop reason: HOSPADM

## 2021-01-01 RX ORDER — ONDANSETRON 4 MG/1
4 TABLET, FILM COATED ORAL EVERY 6 HOURS PRN
Status: DISCONTINUED | OUTPATIENT
Start: 2021-01-01 | End: 2021-01-01

## 2021-01-01 RX ORDER — POLYETHYLENE GLYCOL 3350 17 G/17G
17 POWDER, FOR SOLUTION ORAL DAILY PRN
Status: DISCONTINUED | OUTPATIENT
Start: 2021-01-01 | End: 2021-01-01 | Stop reason: HOSPADM

## 2021-01-01 RX ORDER — POLYETHYLENE GLYCOL 3350 17 G/17G
17 POWDER, FOR SOLUTION ORAL DAILY
Status: DISCONTINUED | OUTPATIENT
Start: 2021-01-01 | End: 2021-01-01 | Stop reason: HOSPADM

## 2021-01-01 RX ORDER — ASPIRIN 81 MG/1
81 TABLET, CHEWABLE ORAL DAILY
COMMUNITY

## 2021-01-01 RX ORDER — POTASSIUM CHLORIDE 7.45 MG/ML
10 INJECTION INTRAVENOUS
Status: DISCONTINUED | OUTPATIENT
Start: 2021-01-01 | End: 2021-01-01 | Stop reason: HOSPADM

## 2021-01-01 RX ORDER — PANTOPRAZOLE SODIUM 40 MG/1
40 TABLET, DELAYED RELEASE ORAL
Status: DISCONTINUED | OUTPATIENT
Start: 2021-01-01 | End: 2021-01-01

## 2021-01-01 RX ORDER — SODIUM CHLORIDE 0.9 % (FLUSH) 0.9 %
10 SYRINGE (ML) INJECTION AS NEEDED
Status: DISCONTINUED | OUTPATIENT
Start: 2021-01-01 | End: 2021-01-01 | Stop reason: HOSPADM

## 2021-01-01 RX ORDER — OXYCODONE HYDROCHLORIDE 5 MG/1
5 TABLET ORAL EVERY 4 HOURS PRN
Status: DISCONTINUED | OUTPATIENT
Start: 2021-01-01 | End: 2021-01-01 | Stop reason: HOSPADM

## 2021-01-01 RX ORDER — ACETAMINOPHEN 325 MG/1
325 TABLET ORAL EVERY 4 HOURS PRN
Status: DISCONTINUED | OUTPATIENT
Start: 2021-01-01 | End: 2021-01-01 | Stop reason: HOSPADM

## 2021-01-01 RX ORDER — FUROSEMIDE 10 MG/ML
40 INJECTION INTRAMUSCULAR; INTRAVENOUS EVERY 12 HOURS SCHEDULED
Status: DISCONTINUED | OUTPATIENT
Start: 2021-01-01 | End: 2021-01-01

## 2021-01-01 RX ORDER — PROMETHAZINE HYDROCHLORIDE 12.5 MG/1
12.5 TABLET ORAL EVERY 6 HOURS PRN
Status: DISCONTINUED | OUTPATIENT
Start: 2021-01-01 | End: 2021-01-01 | Stop reason: HOSPADM

## 2021-01-01 RX ORDER — ASPIRIN 325 MG
325 TABLET, DELAYED RELEASE (ENTERIC COATED) ORAL DAILY
Status: DISCONTINUED | OUTPATIENT
Start: 2021-01-01 | End: 2021-01-01 | Stop reason: HOSPADM

## 2021-01-01 RX ORDER — ALBUTEROL SULFATE 90 UG/1
2 AEROSOL, METERED RESPIRATORY (INHALATION)
Status: DISCONTINUED | OUTPATIENT
Start: 2021-01-01 | End: 2021-01-01 | Stop reason: HOSPADM

## 2021-01-01 RX ORDER — FENTANYL CITRATE 50 UG/ML
INJECTION, SOLUTION INTRAMUSCULAR; INTRAVENOUS AS NEEDED
Status: DISCONTINUED | OUTPATIENT
Start: 2021-01-01 | End: 2021-01-01 | Stop reason: SURG

## 2021-01-01 RX ORDER — PHENYLEPHRINE HCL IN 0.9% NACL 1 MG/10 ML
SYRINGE (ML) INTRAVENOUS AS NEEDED
Status: DISCONTINUED | OUTPATIENT
Start: 2021-01-01 | End: 2021-01-01 | Stop reason: SURG

## 2021-01-01 RX ORDER — SODIUM CHLORIDE 9 MG/ML
50 INJECTION, SOLUTION INTRAVENOUS CONTINUOUS
Status: DISCONTINUED | OUTPATIENT
Start: 2021-01-01 | End: 2021-01-01

## 2021-01-01 RX ADMIN — CEFTRIAXONE SODIUM 1 G: 1 INJECTION, POWDER, FOR SOLUTION INTRAMUSCULAR; INTRAVENOUS at 02:48

## 2021-01-01 RX ADMIN — LISINOPRIL 40 MG: 20 TABLET ORAL at 08:52

## 2021-01-01 RX ADMIN — LISINOPRIL 2.5 MG: 5 TABLET ORAL at 17:44

## 2021-01-01 RX ADMIN — APIXABAN 2.5 MG: 2.5 TABLET, FILM COATED ORAL at 16:51

## 2021-01-01 RX ADMIN — SODIUM CHLORIDE 125 ML/HR: 9 INJECTION, SOLUTION INTRAVENOUS at 20:21

## 2021-01-01 RX ADMIN — Medication 3 ML: at 09:31

## 2021-01-01 RX ADMIN — ALBUTEROL SULFATE 2 PUFF: 108 AEROSOL, METERED RESPIRATORY (INHALATION) at 11:00

## 2021-01-01 RX ADMIN — BISACODYL 10 MG: 10 SUPPOSITORY RECTAL at 18:13

## 2021-01-01 RX ADMIN — BUDESONIDE AND FORMOTEROL FUMARATE DIHYDRATE 2 PUFF: 160; 4.5 AEROSOL RESPIRATORY (INHALATION) at 08:23

## 2021-01-01 RX ADMIN — POTASSIUM CHLORIDE 10 MEQ: 10 INJECTION, SOLUTION INTRAVENOUS at 10:06

## 2021-01-01 RX ADMIN — FENTANYL CITRATE 100 MCG: 50 INJECTION, SOLUTION INTRAMUSCULAR; INTRAVENOUS at 14:22

## 2021-01-01 RX ADMIN — LISINOPRIL 10 MG: 5 TABLET ORAL at 20:49

## 2021-01-01 RX ADMIN — NITROGLYCERIN 1 INCH: 20 OINTMENT TOPICAL at 05:15

## 2021-01-01 RX ADMIN — IPRATROPIUM BROMIDE AND ALBUTEROL SULFATE 3 ML: 2.5; .5 SOLUTION RESPIRATORY (INHALATION) at 12:05

## 2021-01-01 RX ADMIN — Medication 3 ML: at 20:24

## 2021-01-01 RX ADMIN — CLOPIDOGREL BISULFATE 75 MG: 75 TABLET ORAL at 09:20

## 2021-01-01 RX ADMIN — ENOXAPARIN SODIUM 40 MG: 40 INJECTION SUBCUTANEOUS at 17:21

## 2021-01-01 RX ADMIN — BARIUM SULFATE 50 ML: 400 SUSPENSION ORAL at 13:59

## 2021-01-01 RX ADMIN — Medication 10 ML: at 22:10

## 2021-01-01 RX ADMIN — POLYETHYLENE GLYCOL 3350 17 G: 17 POWDER, FOR SOLUTION ORAL at 18:13

## 2021-01-01 RX ADMIN — POTASSIUM CHLORIDE 20 MEQ: 1500 TABLET, EXTENDED RELEASE ORAL at 08:03

## 2021-01-01 RX ADMIN — METRONIDAZOLE 500 MG: 500 INJECTION, SOLUTION INTRAVENOUS at 17:21

## 2021-01-01 RX ADMIN — FUROSEMIDE 40 MG: 10 INJECTION, SOLUTION INTRAMUSCULAR; INTRAVENOUS at 00:30

## 2021-01-01 RX ADMIN — IPRATROPIUM BROMIDE AND ALBUTEROL SULFATE 3 ML: 2.5; .5 SOLUTION RESPIRATORY (INHALATION) at 12:08

## 2021-01-01 RX ADMIN — FUROSEMIDE 40 MG: 40 TABLET ORAL at 08:57

## 2021-01-01 RX ADMIN — KETOROLAC TROMETHAMINE 15 MG: 15 INJECTION, SOLUTION INTRAMUSCULAR; INTRAVENOUS at 00:21

## 2021-01-01 RX ADMIN — TAMSULOSIN HYDROCHLORIDE 0.4 MG: 0.4 CAPSULE ORAL at 09:23

## 2021-01-01 RX ADMIN — ISOSORBIDE MONONITRATE 30 MG: 30 TABLET, EXTENDED RELEASE ORAL at 08:57

## 2021-01-01 RX ADMIN — LISINOPRIL 10 MG: 5 TABLET ORAL at 20:59

## 2021-01-01 RX ADMIN — APIXABAN 2.5 MG: 2.5 TABLET, FILM COATED ORAL at 11:18

## 2021-01-01 RX ADMIN — METRONIDAZOLE 500 MG: 500 INJECTION, SOLUTION INTRAVENOUS at 01:00

## 2021-01-01 RX ADMIN — IPRATROPIUM BROMIDE AND ALBUTEROL SULFATE 3 ML: 2.5; .5 SOLUTION RESPIRATORY (INHALATION) at 11:43

## 2021-01-01 RX ADMIN — BARIUM SULFATE 183 ML: 960 POWDER, FOR SUSPENSION ORAL at 14:00

## 2021-01-01 RX ADMIN — Medication 3 ML: at 20:22

## 2021-01-01 RX ADMIN — BUDESONIDE 0.5 MG: 0.5 SUSPENSION RESPIRATORY (INHALATION) at 20:21

## 2021-01-01 RX ADMIN — LISINOPRIL 10 MG: 5 TABLET ORAL at 08:02

## 2021-01-01 RX ADMIN — FUROSEMIDE 40 MG: 10 INJECTION, SOLUTION INTRAMUSCULAR; INTRAVENOUS at 09:30

## 2021-01-01 RX ADMIN — METRONIDAZOLE 500 MG: 500 INJECTION, SOLUTION INTRAVENOUS at 08:12

## 2021-01-01 RX ADMIN — PANTOPRAZOLE SODIUM 40 MG: 40 TABLET, DELAYED RELEASE ORAL at 05:48

## 2021-01-01 RX ADMIN — ASPIRIN 81 MG CHEWABLE TABLET 81 MG: 81 TABLET CHEWABLE at 08:57

## 2021-01-01 RX ADMIN — TAMSULOSIN HYDROCHLORIDE 0.4 MG: 0.4 CAPSULE ORAL at 08:58

## 2021-01-01 RX ADMIN — METOPROLOL SUCCINATE 50 MG: 50 TABLET, EXTENDED RELEASE ORAL at 09:23

## 2021-01-01 RX ADMIN — FUROSEMIDE 40 MG: 10 INJECTION, SOLUTION INTRAMUSCULAR; INTRAVENOUS at 20:24

## 2021-01-01 RX ADMIN — FUROSEMIDE 40 MG: 10 INJECTION, SOLUTION INTRAMUSCULAR; INTRAVENOUS at 10:01

## 2021-01-01 RX ADMIN — ENOXAPARIN SODIUM 40 MG: 40 INJECTION SUBCUTANEOUS at 17:47

## 2021-01-01 RX ADMIN — POTASSIUM CHLORIDE 10 MEQ: 10 INJECTION, SOLUTION INTRAVENOUS at 08:07

## 2021-01-01 RX ADMIN — BUDESONIDE 0.5 MG: 0.5 SUSPENSION RESPIRATORY (INHALATION) at 06:57

## 2021-01-01 RX ADMIN — IPRATROPIUM BROMIDE AND ALBUTEROL SULFATE 3 ML: 2.5; .5 SOLUTION RESPIRATORY (INHALATION) at 19:29

## 2021-01-01 RX ADMIN — TAMSULOSIN HYDROCHLORIDE 0.4 MG: 0.4 CAPSULE ORAL at 12:23

## 2021-01-01 RX ADMIN — POTASSIUM CHLORIDE 40 MEQ: 1500 TABLET, EXTENDED RELEASE ORAL at 10:56

## 2021-01-01 RX ADMIN — Medication 3 ML: at 11:19

## 2021-01-01 RX ADMIN — CEFTRIAXONE SODIUM 1 G: 1 INJECTION, POWDER, FOR SOLUTION INTRAMUSCULAR; INTRAVENOUS at 04:00

## 2021-01-01 RX ADMIN — IPRATROPIUM BROMIDE AND ALBUTEROL SULFATE 3 ML: 2.5; .5 SOLUTION RESPIRATORY (INHALATION) at 23:24

## 2021-01-01 RX ADMIN — POTASSIUM CHLORIDE 10 MEQ: 10 INJECTION, SOLUTION INTRAVENOUS at 15:22

## 2021-01-01 RX ADMIN — ASPIRIN 81 MG CHEWABLE TABLET 81 MG: 81 TABLET CHEWABLE at 11:18

## 2021-01-01 RX ADMIN — IPRATROPIUM BROMIDE AND ALBUTEROL SULFATE 3 ML: 2.5; .5 SOLUTION RESPIRATORY (INHALATION) at 06:55

## 2021-01-01 RX ADMIN — IPRATROPIUM BROMIDE AND ALBUTEROL SULFATE 3 ML: 2.5; .5 SOLUTION RESPIRATORY (INHALATION) at 18:08

## 2021-01-01 RX ADMIN — LISINOPRIL 40 MG: 20 TABLET ORAL at 09:24

## 2021-01-01 RX ADMIN — TAMSULOSIN HYDROCHLORIDE 0.4 MG: 0.4 CAPSULE ORAL at 11:17

## 2021-01-01 RX ADMIN — Medication 10 ML: at 00:21

## 2021-01-01 RX ADMIN — Medication 100 MCG: at 14:39

## 2021-01-01 RX ADMIN — CEFAZOLIN SODIUM 2 G: 1 INJECTION, POWDER, FOR SOLUTION INTRAMUSCULAR; INTRAVENOUS at 14:26

## 2021-01-01 RX ADMIN — NITROGLYCERIN 1 INCH: 20 OINTMENT TOPICAL at 12:59

## 2021-01-01 RX ADMIN — METRONIDAZOLE 500 MG: 500 INJECTION, SOLUTION INTRAVENOUS at 08:06

## 2021-01-01 RX ADMIN — Medication 3 ML: at 08:12

## 2021-01-01 RX ADMIN — POTASSIUM CHLORIDE 10 MEQ: 10 INJECTION, SOLUTION INTRAVENOUS at 16:34

## 2021-01-01 RX ADMIN — ENOXAPARIN SODIUM 40 MG: 40 INJECTION SUBCUTANEOUS at 15:09

## 2021-01-01 RX ADMIN — BISACODYL 10 MG: 10 SUPPOSITORY RECTAL at 09:22

## 2021-01-01 RX ADMIN — INSULIN LISPRO 2 UNITS: 100 INJECTION, SOLUTION INTRAVENOUS; SUBCUTANEOUS at 08:58

## 2021-01-01 RX ADMIN — LIDOCAINE HYDROCHLORIDE 50 MG: 20 INJECTION, SOLUTION EPIDURAL; INFILTRATION; INTRACAUDAL; PERINEURAL at 14:21

## 2021-01-01 RX ADMIN — IPRATROPIUM BROMIDE AND ALBUTEROL SULFATE 3 ML: 2.5; .5 SOLUTION RESPIRATORY (INHALATION) at 18:48

## 2021-01-01 RX ADMIN — FUROSEMIDE 40 MG: 40 TABLET ORAL at 10:56

## 2021-01-01 RX ADMIN — LISINOPRIL 10 MG: 5 TABLET ORAL at 08:57

## 2021-01-01 RX ADMIN — Medication 3 ML: at 21:56

## 2021-01-01 RX ADMIN — TRANEXAMIC ACID 1000 MG: 100 INJECTION, SOLUTION INTRAVENOUS at 14:29

## 2021-01-01 RX ADMIN — CEFAZOLIN SODIUM 2 G: 10 INJECTION, POWDER, FOR SOLUTION INTRAVENOUS at 22:10

## 2021-01-01 RX ADMIN — Medication 10 ML: at 20:22

## 2021-01-01 RX ADMIN — ALBUTEROL SULFATE 2 PUFF: 108 AEROSOL, METERED RESPIRATORY (INHALATION) at 08:20

## 2021-01-01 RX ADMIN — APIXABAN 2.5 MG: 2.5 TABLET, FILM COATED ORAL at 16:05

## 2021-01-01 RX ADMIN — ENOXAPARIN SODIUM 40 MG: 40 INJECTION SUBCUTANEOUS at 16:21

## 2021-01-01 RX ADMIN — ISOSORBIDE MONONITRATE 30 MG: 30 TABLET, EXTENDED RELEASE ORAL at 11:19

## 2021-01-01 RX ADMIN — ISOSORBIDE MONONITRATE 30 MG: 30 TABLET, EXTENDED RELEASE ORAL at 09:23

## 2021-01-01 RX ADMIN — METOPROLOL SUCCINATE 25 MG: 25 TABLET, EXTENDED RELEASE ORAL at 08:12

## 2021-01-01 RX ADMIN — Medication 5 MG: at 00:07

## 2021-01-01 RX ADMIN — ALBUTEROL SULFATE 2 PUFF: 108 AEROSOL, METERED RESPIRATORY (INHALATION) at 19:47

## 2021-01-01 RX ADMIN — Medication 3 ML: at 21:15

## 2021-01-01 RX ADMIN — BUDESONIDE 0.5 MG: 0.5 SUSPENSION RESPIRATORY (INHALATION) at 06:55

## 2021-01-01 RX ADMIN — ASPIRIN 81 MG CHEWABLE TABLET 81 MG: 81 TABLET CHEWABLE at 16:30

## 2021-01-01 RX ADMIN — LISINOPRIL 10 MG: 5 TABLET ORAL at 21:56

## 2021-01-01 RX ADMIN — POTASSIUM CHLORIDE 20 MEQ: 1500 TABLET, EXTENDED RELEASE ORAL at 08:57

## 2021-01-01 RX ADMIN — ALBUTEROL SULFATE 2 PUFF: 108 AEROSOL, METERED RESPIRATORY (INHALATION) at 10:55

## 2021-01-01 RX ADMIN — FUROSEMIDE 40 MG: 10 INJECTION, SOLUTION INTRAMUSCULAR; INTRAVENOUS at 20:11

## 2021-01-01 RX ADMIN — POTASSIUM CHLORIDE 20 MEQ: 1500 TABLET, EXTENDED RELEASE ORAL at 11:17

## 2021-01-01 RX ADMIN — Medication 3 ML: at 20:08

## 2021-01-01 RX ADMIN — PREDNISONE 20 MG: 20 TABLET ORAL at 16:51

## 2021-01-01 RX ADMIN — Medication 3 ML: at 08:07

## 2021-01-01 RX ADMIN — ONDANSETRON 4 MG: 2 INJECTION INTRAMUSCULAR; INTRAVENOUS at 15:22

## 2021-01-01 RX ADMIN — IPRATROPIUM BROMIDE AND ALBUTEROL SULFATE 3 ML: 2.5; .5 SOLUTION RESPIRATORY (INHALATION) at 06:52

## 2021-01-01 RX ADMIN — APIXABAN 2.5 MG: 2.5 TABLET, FILM COATED ORAL at 21:56

## 2021-01-01 RX ADMIN — METRONIDAZOLE 500 MG: 500 INJECTION, SOLUTION INTRAVENOUS at 01:37

## 2021-01-01 RX ADMIN — BUDESONIDE 0.5 MG: 0.5 SUSPENSION RESPIRATORY (INHALATION) at 18:48

## 2021-01-01 RX ADMIN — TAMSULOSIN HYDROCHLORIDE 0.4 MG: 0.4 CAPSULE ORAL at 08:02

## 2021-01-01 RX ADMIN — Medication 3 ML: at 00:31

## 2021-01-01 RX ADMIN — POTASSIUM CHLORIDE 20 MEQ: 1500 TABLET, EXTENDED RELEASE ORAL at 09:22

## 2021-01-01 RX ADMIN — PANTOPRAZOLE SODIUM 40 MG: 40 TABLET, DELAYED RELEASE ORAL at 05:51

## 2021-01-01 RX ADMIN — KETOROLAC TROMETHAMINE 15 MG: 15 INJECTION, SOLUTION INTRAMUSCULAR; INTRAVENOUS at 11:42

## 2021-01-01 RX ADMIN — Medication 10 ML: at 08:52

## 2021-01-01 RX ADMIN — ISOSORBIDE MONONITRATE 30 MG: 30 TABLET, EXTENDED RELEASE ORAL at 08:52

## 2021-01-01 RX ADMIN — Medication 3 ML: at 20:11

## 2021-01-01 RX ADMIN — IPRATROPIUM BROMIDE AND ALBUTEROL SULFATE 3 ML: 2.5; .5 SOLUTION RESPIRATORY (INHALATION) at 20:15

## 2021-01-01 RX ADMIN — CLOPIDOGREL BISULFATE 75 MG: 75 TABLET ORAL at 11:19

## 2021-01-01 RX ADMIN — ONDANSETRON HYDROCHLORIDE 4 MG: 4 TABLET, FILM COATED ORAL at 16:05

## 2021-01-01 RX ADMIN — METOPROLOL SUCCINATE 50 MG: 50 TABLET, EXTENDED RELEASE ORAL at 08:52

## 2021-01-01 RX ADMIN — FUROSEMIDE 40 MG: 40 TABLET ORAL at 08:52

## 2021-01-01 RX ADMIN — METRONIDAZOLE 500 MG: 500 INJECTION, SOLUTION INTRAVENOUS at 15:09

## 2021-01-01 RX ADMIN — CEFTRIAXONE SODIUM 1 G: 1 INJECTION, POWDER, FOR SOLUTION INTRAMUSCULAR; INTRAVENOUS at 02:49

## 2021-01-01 RX ADMIN — METRONIDAZOLE 500 MG: 500 INJECTION, SOLUTION INTRAVENOUS at 16:21

## 2021-01-01 RX ADMIN — ISOSORBIDE MONONITRATE 30 MG: 30 TABLET, EXTENDED RELEASE ORAL at 08:07

## 2021-01-01 RX ADMIN — CLOPIDOGREL BISULFATE 75 MG: 75 TABLET ORAL at 08:02

## 2021-01-01 RX ADMIN — CEFTRIAXONE SODIUM 1 G: 1 INJECTION, POWDER, FOR SOLUTION INTRAMUSCULAR; INTRAVENOUS at 05:16

## 2021-01-01 RX ADMIN — ISOSORBIDE MONONITRATE 30 MG: 30 TABLET, EXTENDED RELEASE ORAL at 16:30

## 2021-01-01 RX ADMIN — METRONIDAZOLE 500 MG: 500 INJECTION, SOLUTION INTRAVENOUS at 09:30

## 2021-01-01 RX ADMIN — Medication 3 ML: at 08:59

## 2021-01-01 RX ADMIN — CEFAZOLIN SODIUM 2 G: 10 INJECTION, POWDER, FOR SOLUTION INTRAVENOUS at 05:47

## 2021-01-01 RX ADMIN — ALBUTEROL SULFATE 2 PUFF: 108 AEROSOL, METERED RESPIRATORY (INHALATION) at 07:25

## 2021-01-01 RX ADMIN — METRONIDAZOLE 500 MG: 500 INJECTION, SOLUTION INTRAVENOUS at 08:01

## 2021-01-01 RX ADMIN — BUDESONIDE 0.5 MG: 0.5 SUSPENSION RESPIRATORY (INHALATION) at 18:08

## 2021-01-01 RX ADMIN — ASPIRIN 81 MG CHEWABLE TABLET 81 MG: 81 TABLET CHEWABLE at 08:01

## 2021-01-01 RX ADMIN — ASPIRIN 325 MG: 325 TABLET, COATED ORAL at 09:22

## 2021-01-01 RX ADMIN — PREDNISONE 20 MG: 20 TABLET ORAL at 08:57

## 2021-01-01 RX ADMIN — POLYETHYLENE GLYCOL 3350 17 G: 17 POWDER, FOR SOLUTION ORAL at 09:22

## 2021-01-01 RX ADMIN — POTASSIUM CHLORIDE 10 MEQ: 10 INJECTION, SOLUTION INTRAVENOUS at 11:32

## 2021-01-01 RX ADMIN — ASPIRIN 81 MG CHEWABLE TABLET 81 MG: 81 TABLET CHEWABLE at 08:06

## 2021-01-01 RX ADMIN — METRONIDAZOLE 500 MG: 500 INJECTION, SOLUTION INTRAVENOUS at 17:46

## 2021-01-01 RX ADMIN — CLOPIDOGREL BISULFATE 75 MG: 75 TABLET ORAL at 08:57

## 2021-01-01 RX ADMIN — ENOXAPARIN SODIUM 40 MG: 40 INJECTION SUBCUTANEOUS at 17:05

## 2021-01-01 RX ADMIN — PREDNISONE 20 MG: 20 TABLET ORAL at 21:04

## 2021-01-01 RX ADMIN — METRONIDAZOLE 500 MG: 500 INJECTION, SOLUTION INTRAVENOUS at 09:56

## 2021-01-01 RX ADMIN — FUROSEMIDE 40 MG: 40 TABLET ORAL at 11:18

## 2021-01-01 RX ADMIN — FUROSEMIDE 40 MG: 10 INJECTION, SOLUTION INTRAMUSCULAR; INTRAVENOUS at 08:06

## 2021-01-01 RX ADMIN — BUDESONIDE AND FORMOTEROL FUMARATE DIHYDRATE 2 PUFF: 160; 4.5 AEROSOL RESPIRATORY (INHALATION) at 19:47

## 2021-01-01 RX ADMIN — METOPROLOL SUCCINATE 25 MG: 25 TABLET, EXTENDED RELEASE ORAL at 08:07

## 2021-01-01 RX ADMIN — ISOSORBIDE MONONITRATE 30 MG: 30 TABLET, EXTENDED RELEASE ORAL at 08:12

## 2021-01-01 RX ADMIN — PREDNISONE 20 MG: 20 TABLET ORAL at 11:18

## 2021-01-01 RX ADMIN — BUDESONIDE 0.5 MG: 0.5 SUSPENSION RESPIRATORY (INHALATION) at 19:30

## 2021-01-01 RX ADMIN — METOPROLOL SUCCINATE 25 MG: 25 TABLET, EXTENDED RELEASE ORAL at 16:21

## 2021-01-01 RX ADMIN — POTASSIUM CHLORIDE 10 MEQ: 10 INJECTION, SOLUTION INTRAVENOUS at 12:46

## 2021-01-01 RX ADMIN — POTASSIUM CHLORIDE 20 MEQ: 1500 TABLET, EXTENDED RELEASE ORAL at 08:52

## 2021-01-01 RX ADMIN — POTASSIUM CHLORIDE 10 MEQ: 10 INJECTION, SOLUTION INTRAVENOUS at 10:10

## 2021-01-01 RX ADMIN — SODIUM CHLORIDE: 0.9 INJECTION, SOLUTION INTRAVENOUS at 14:15

## 2021-01-01 RX ADMIN — ASPIRIN 81 MG CHEWABLE TABLET 81 MG: 81 TABLET CHEWABLE at 08:12

## 2021-01-01 RX ADMIN — POTASSIUM CHLORIDE 10 MEQ: 10 INJECTION, SOLUTION INTRAVENOUS at 09:16

## 2021-01-01 RX ADMIN — SODIUM CHLORIDE 125 ML/HR: 9 INJECTION, SOLUTION INTRAVENOUS at 18:10

## 2021-01-01 RX ADMIN — BUDESONIDE AND FORMOTEROL FUMARATE DIHYDRATE 2 PUFF: 160; 4.5 AEROSOL RESPIRATORY (INHALATION) at 07:25

## 2021-01-01 RX ADMIN — CLOPIDOGREL BISULFATE 75 MG: 75 TABLET ORAL at 09:23

## 2021-01-01 RX ADMIN — Medication 3 ML: at 08:02

## 2021-01-01 RX ADMIN — FUROSEMIDE 40 MG: 40 TABLET ORAL at 08:02

## 2021-01-01 RX ADMIN — METOPROLOL SUCCINATE 50 MG: 50 TABLET, EXTENDED RELEASE ORAL at 11:18

## 2021-01-01 RX ADMIN — LISINOPRIL 10 MG: 5 TABLET ORAL at 11:18

## 2021-01-01 RX ADMIN — ALBUTEROL SULFATE 2 PUFF: 108 AEROSOL, METERED RESPIRATORY (INHALATION) at 11:56

## 2021-01-01 RX ADMIN — LISINOPRIL 2.5 MG: 5 TABLET ORAL at 08:12

## 2021-01-01 RX ADMIN — METRONIDAZOLE 500 MG: 500 INJECTION, SOLUTION INTRAVENOUS at 00:42

## 2021-01-01 RX ADMIN — IPRATROPIUM BROMIDE AND ALBUTEROL SULFATE 3 ML: 2.5; .5 SOLUTION RESPIRATORY (INHALATION) at 10:47

## 2021-01-01 RX ADMIN — Medication 3 ML: at 10:01

## 2021-01-01 RX ADMIN — METRONIDAZOLE 500 MG: 500 INJECTION, SOLUTION INTRAVENOUS at 17:05

## 2021-01-01 RX ADMIN — PREDNISONE 20 MG: 20 TABLET ORAL at 17:38

## 2021-01-01 RX ADMIN — IPRATROPIUM BROMIDE AND ALBUTEROL SULFATE 3 ML: 2.5; .5 SOLUTION RESPIRATORY (INHALATION) at 06:50

## 2021-01-01 RX ADMIN — POTASSIUM CHLORIDE 10 MEQ: 10 INJECTION, SOLUTION INTRAVENOUS at 11:16

## 2021-01-01 RX ADMIN — ROCURONIUM BROMIDE 30 MG: 10 INJECTION INTRAVENOUS at 14:22

## 2021-01-01 RX ADMIN — PROPOFOL 100 MG: 10 INJECTION, EMULSION INTRAVENOUS at 14:22

## 2021-01-01 RX ADMIN — Medication 3 ML: at 20:49

## 2021-01-01 RX ADMIN — TAMSULOSIN HYDROCHLORIDE 0.4 MG: 0.4 CAPSULE ORAL at 08:52

## 2021-01-01 RX ADMIN — POTASSIUM CHLORIDE 10 MEQ: 10 INJECTION, SOLUTION INTRAVENOUS at 14:02

## 2021-01-01 RX ADMIN — FUROSEMIDE 40 MG: 40 TABLET ORAL at 09:23

## 2021-01-01 RX ADMIN — SODIUM CHLORIDE 125 ML/HR: 9 INJECTION, SOLUTION INTRAVENOUS at 03:18

## 2021-01-01 RX ADMIN — Medication 3 ML: at 09:22

## 2021-01-01 RX ADMIN — ASPIRIN 81 MG CHEWABLE TABLET 81 MG: 81 TABLET CHEWABLE at 09:56

## 2021-01-01 RX ADMIN — METOPROLOL SUCCINATE 50 MG: 50 TABLET, EXTENDED RELEASE ORAL at 08:57

## 2021-01-01 RX ADMIN — METRONIDAZOLE 500 MG: 500 INJECTION, SOLUTION INTRAVENOUS at 00:06

## 2021-05-01 PROBLEM — I21.4 NSTEMI (NON-ST ELEVATED MYOCARDIAL INFARCTION) (HCC): Status: ACTIVE | Noted: 2021-01-01

## 2021-05-02 PROBLEM — D72.829 LEUKOCYTOSIS: Status: ACTIVE | Noted: 2021-01-01

## 2021-05-02 PROBLEM — R73.9 HYPERGLYCEMIA: Status: ACTIVE | Noted: 2021-01-01

## 2021-05-02 PROBLEM — I50.43 ACUTE ON CHRONIC COMBINED SYSTOLIC AND DIASTOLIC CHF (CONGESTIVE HEART FAILURE) (HCC): Status: ACTIVE | Noted: 2021-01-01

## 2021-05-04 PROBLEM — E43 SEVERE MALNUTRITION (HCC): Status: ACTIVE | Noted: 2021-01-01

## 2021-05-08 PROBLEM — U07.1 COVID-19 VIRUS DETECTED: Status: ACTIVE | Noted: 2021-01-01

## 2021-05-08 NOTE — OUTREACH NOTE
Prep Survey      Responses   [facility]   Is LACE score < 7 ?  No   Emergency Room discharge w/ pulse ox?  No   Eligibility  Readm Mgmt   Discharge diagnosis  NSTEMI (Acute on chronic  CHF Severe malnutrition Covid   Does the patient have one of the following disease processes/diagnoses(primary or secondary)?  COVID-19   Does the patient have Home health ordered?  No   Is there a DME ordered?  No   Prep survey completed?  Yes          Maribel Cordero RN

## 2021-05-09 NOTE — OUTREACH NOTE
COVID-19 Week 1 Survey      Responses   [facility] patient discharged from?  [facility]   Does the patient have one of the following disease processes/diagnoses(primary or secondary)?  COVID-19   COVID-19 underlying condition?  None   Call Number  Call 1   Week 1 Call successful?  No   Discharge diagnosis  NSTEMI (Acute on chronic  CHF Severe malnutrition Aviva Mercedes RN

## 2021-05-10 NOTE — OUTREACH NOTE
"COVID-19 Week 1 Survey      Responses   [facility] patient discharged from?  [facility]   Does the patient have one of the following disease processes/diagnoses(primary or secondary)?  COVID-19   COVID-19 underlying condition?  None   Call Number  Call 2   Week 1 Call successful?  Yes   Call start time  [phone]   Call end time  [phone]   General alerts for this patient  Patient has dementia-sisters are caring for him.   Is patient permission given to speak with other caregiver?  Yes   List who call center can speak with  Either sister.   Person spoke with today (if not patient) and relationship  Sister-\"Fede\"   Meds reviewed with patient/caregiver?  Yes   Is the patient having any side effects they believe may be caused by any medication additions or changes?  No   Does the patient have all medications ordered at discharge?  Yes   Is the patient taking all medications as directed (includes completed medication regime)?  Yes   Comments regarding appointments  PCP appt 05/24/21.   Does the patient have a primary care provider?   Yes   Does the patient have an appointment with their PCP or specialist within 7 days of discharge?  Greater than 7 days   What is preventing the patient from scheduling follow up appointments within 7 days of discharge?  Earlier appointment not available   Nursing Interventions  Verified appointment date/time/provider   Has the patient kept scheduled appointments due by today?  N/A   Has home health visited the patient within 72 hours of discharge?  N/A   Psychosocial issues?  No   Psychosocial comments  Both of his sisters are caring for him. Patient has dementia.   Did the patient receive a copy of their discharge instructions?  Yes   Did the patient receive a copy of COVID-19 specific instructions?  Yes   Nursing interventions  Reviewed instructions with patient   What is the patient's perception of their health status since discharge?  Improving   Does the patient have any of the following " "symptoms?  Cough [Slight cough-improving.]   Nursing Interventions  Nurse provided patient education   Pulse Ox monitoring  Intermittent   Pulse Ox device source  Patient   O2 Sat comments  O2 sats remaining \"in the 90s\".   O2 Sat: education provided  Sat levels, Monitoring frequency, When to seek care   O2 Sat education comments  Advised to return to ER if O2 sats remain below 90%.   Is the patient/caregiver able to teach back steps to recovery at home?  Set small, achievable goals for return to baseline health, Eat a well-balance diet, Rest and rebuild strength, gradually increase activity   If the patient is a current smoker, are they able to teach back resources for cessation?  Not a smoker   Is the patient/caregiver able to teach back the hierarchy of who to call/visit for symptoms/problems? PCP, Specialist, Home health nurse, Urgent Care, ED, 911  Yes   COVID-19 call completed?  Yes   Wrap up additional comments  Sister states patient is \"doing good\". States walking some with assistance-also uses wheelchair if weak. States good appetite-sleeping well. States cough improving-doing breathing tx bid. Sisters are caring for patient. States has already replaced toothbrush/paste/razor. Continues with quarantine. Denies any needs today.          Tenisha Lynch RN  "

## 2021-05-11 NOTE — OUTREACH NOTE
COVID-19 Week 1 Survey      Responses   [facility] [place] patient discharged from?  [facility]   Does the patient have one of the following disease processes/diagnoses(primary or secondary)?  COVID-19   COVID-19 underlying condition?  None   Call Number  Call 3   Week 1 Call successful?  No   Discharge diagnosis  NSTEMI (Acute on chronic  CHF Severe malnutrition Covid          Connie Marshall, FRANSICON

## 2021-05-17 NOTE — OUTREACH NOTE
COVID-19 Week 2 Survey      Responses   [facility] patient discharged from?  [facility]   Does the patient have one of the following disease processes/diagnoses(primary or secondary)?  COVID-19   COVID-19 underlying condition?  None   Call Number  Call 1   COVID-19 Week 2: Call 1 attempt successful?  Yes   Call start time  0940   Call end time  0949   Is patient permission given to speak with other caregiver?  Yes   Person spoke with today (if not patient) and relationship  sister-Fede   Meds reviewed with patient/caregiver?  Yes   Is the patient having any side effects they believe may be caused by any medication additions or changes?  No   Does the patient have all medications ordered at discharge?  Yes   Is the patient taking all medications as directed (includes completed medication regime)?  Yes   Does the patient have a primary care provider?   Yes   Has the patient kept scheduled appointments due by today?  N/A   Has home health visited the patient within 72 hours of discharge?  N/A   Psychosocial issues?  No   What is the patient's perception of their health status since discharge?  Improving   Does the patient have any of the following symptoms?  None   Nursing Interventions  Nurse provided patient education   Pulse Ox monitoring  Intermittent   Pulse Ox device source  Patient   O2 Sat comments  Reports O2 sats at 91%.   Is the patient/caregiver able to teach back the hierarchy of who to call/visit for symptoms/problems? PCP, Specialist, Home health nurse, Urgent Care, ED, 911  Yes   COVID-19 call completed?  Yes   Wrap up additional comments  Sister states patient is improving. States no longer coughing. Reports appetite improved, sleeping well. Denies any SOA, fever, or chest pain. Denies any needs today.          Tenisha Lynch RN

## 2021-05-21 NOTE — OUTREACH NOTE
COVID-19 Week 3 Survey      Responses   [facility] facility patient discharged from?  [facility]   Does the patient have one of the following disease processes/diagnoses(primary or secondary)?  COVID-19   COVID-19 underlying condition?  None   Call Number  Call 1   COVID-19 Week 3: Call 1 attempt successful?  No   Revoke  Phone number issues   Discharge diagnosis  NSTEMI (Acute on chronic  CHF Severe malnutrition Aviva Basilio RN

## 2021-08-21 PROBLEM — I50.43 ACUTE ON CHRONIC COMBINED SYSTOLIC AND DIASTOLIC CHF (CONGESTIVE HEART FAILURE) (HCC): Chronic | Status: ACTIVE | Noted: 2021-01-01

## 2021-08-21 PROBLEM — S72.91XA CLOSED FRACTURE OF RIGHT FEMUR, UNSPECIFIED FRACTURE MORPHOLOGY, INITIAL ENCOUNTER (HCC): Status: ACTIVE | Noted: 2021-01-01

## 2021-08-21 PROBLEM — R73.9 HYPERGLYCEMIA: Chronic | Status: ACTIVE | Noted: 2021-01-01

## 2021-08-21 PROBLEM — E43 SEVERE MALNUTRITION (HCC): Chronic | Status: ACTIVE | Noted: 2021-01-01

## 2021-08-21 PROBLEM — G30.9 ALZHEIMER'S DEMENTIA WITHOUT BEHAVIORAL DISTURBANCE (HCC): Chronic | Status: ACTIVE | Noted: 2021-01-01

## 2021-08-21 PROBLEM — F02.80 ALZHEIMER'S DEMENTIA WITHOUT BEHAVIORAL DISTURBANCE (HCC): Chronic | Status: ACTIVE | Noted: 2021-01-01

## 2021-08-21 PROBLEM — U07.1 COVID-19 VIRUS DETECTED: Chronic | Status: ACTIVE | Noted: 2021-01-01

## 2021-08-21 PROBLEM — S72.001A CLOSED RIGHT HIP FRACTURE, INITIAL ENCOUNTER (HCC): Status: RESOLVED | Noted: 2021-01-01 | Resolved: 2021-01-01

## 2021-08-21 PROBLEM — D72.829 LEUKOCYTOSIS: Chronic | Status: ACTIVE | Noted: 2021-01-01

## 2021-08-21 PROBLEM — I21.4 NSTEMI (NON-ST ELEVATED MYOCARDIAL INFARCTION) (HCC): Chronic | Status: ACTIVE | Noted: 2021-01-01

## 2021-08-21 PROBLEM — S72.001A CLOSED RIGHT HIP FRACTURE, INITIAL ENCOUNTER (HCC): Status: ACTIVE | Noted: 2021-01-01

## 2021-08-22 NOTE — PROGRESS NOTES
[facility] Medicine Services Daily Progress Note    Patient Name: Tariq Dominique  : 1935  MRN: [id number]  Primary Care Physician:  Mau Mcarthur MD  Date of admission: 2021      Subjective      Chief Complaint: right femoral neck fracture    Date    2021  Patient severely demented, does not appear to be in distress.  Ortho plans surgery today.  Patient is high cardiac risk for surgery but family in agreement with surgery.    ROS   Unable to obtain due to dementia      Objective      Vitals:   Temp:  [98.2 °F (36.8 °C)-98.9 °F (37.2 °C)] 98.7 °F (37.1 °C)  Heart Rate:  [] 106  Resp:  [15-18] 16  BP: (138-176)/() 176/57  Flow (L/min):  [2] 2    Physical Exam  Constitutional:       General: He is not in acute distress.     Appearance: He is not ill-appearing or toxic-appearing.      Comments: AAOx0  Patient severely demented   HENT:      Head: Normocephalic and atraumatic.      Nose: No congestion.      Mouth/Throat:      Pharynx: Oropharynx is clear.   Eyes:      General: No scleral icterus.  Cardiovascular:      Rate and Rhythm: Normal rate and regular rhythm.      Heart sounds: No murmur heard.   No friction rub. No gallop.    Pulmonary:      Effort: No respiratory distress.      Breath sounds: No wheezing or rales.   Abdominal:      General: There is no distension.      Tenderness: There is no abdominal tenderness. There is no guarding.   Musculoskeletal:         General: No swelling or deformity.      Cervical back: Normal range of motion.      Right lower leg: No edema.      Left lower leg: No edema.      Comments: Right leg is flexed, abducted, and internally rotated   Skin:     Coloration: Skin is not jaundiced.      Findings: No bruising or lesion.   Neurological:      Mental Status: He is disoriented.      Motor: No weakness.      Comments: Patient severely demented             Result Review    Result Review:  I have personally reviewed the results  from the time of this admission to 8/22/2021 14:08 EDT and agree with these findings:  [x]  Laboratory  []  Microbiology  [x]  Radiology  []  EKG/Telemetry   [x]  Cardiology/Vascular   []  Pathology  []  Old records  []  Other:         Wounds (last 24 hours)      LDA Wound     Row Name 08/22/21 0803 08/22/21 0252 08/21/21 2359       Wound 08/21/21 2010 Left index finger Abrasion    Wound - Properties Group Placement Date: 08/21/21 -RD Placement Time: 2010 -RD Present on Hospital Admission: Y  -RD Side: Left  -RD Location: index finger  -RD Primary Wound Type: Abrasion  -RD    Dressing Appearance  --  dry;intact  -RD  dry;intact  -RD    Base  dry  -SL  --  dry  -RD    Retired Wound - Properties Group Date first assessed: 08/21/21 -RD Time first assessed: 2010 -RD Present on Hospital Admission: Y  -RD Side: Left  -RD Location: index finger  -RD Primary Wound Type: Abrasion  -RD    Row Name 08/21/21 2000             Wound 08/21/21 2010 Left index finger Abrasion    Wound - Properties Group Placement Date: 08/21/21 -RD Placement Time: 2010 -RD Present on Hospital Admission: Y  -RD Side: Left  -RD Location: index finger  -RD Primary Wound Type: Abrasion  -RD    Dressing Appearance  dry;intact  -RD      Base  dry  -RD      Dressing Care  other (see comments) bandaid  -RD      Retired Wound - Properties Group Date first assessed: 08/21/21 -RD Time first assessed: 2010 -RD Present on Hospital Admission: Y  -RD Side: Left  -RD Location: index finger  -RD Primary Wound Type: Abrasion  -RD      User Key  (r) = Recorded By, (t) = Taken By, (c) = Cosigned By    Initials Name Provider Type    Kayla Mackey RN Registered Nurse    Shalini Matthews RN Registered Nurse            Assessment/Plan      Brief Patient Summary:  Tariq Dominique is a 86 y.o. male who       bisacodyl, 10 mg, Rectal, Daily  ceFAZolin, 2 g, Intravenous, Once  furosemide, 40 mg, Oral, Daily  isosorbide mononitrate, 30 mg, Oral,  Q24H  lisinopril, 40 mg, Oral, Daily  metoprolol succinate XL, 50 mg, Oral, Q24H  potassium chloride, 20 mEq, Oral, Daily  sodium chloride, 10 mL, Intravenous, Q12H  tamsulosin, 0.4 mg, Oral, Daily  tranexamic acid, 1,000 mg, Intravenous, Once             Active Hospital Problems:  Active Hospital Problems    Diagnosis    • **Closed fracture of right femur, unspecified fracture morphology, initial encounter (CMS/[facility])    • Alzheimer's dementia without behavioral disturbance (CMS/[facility])    • Severe malnutrition (CMS/[facility])    • Acute on chronic combined systolic and diastolic CHF (congestive heart failure) (CMS/[facility])      Plan:   #Closed fracture of right femur, unspecified fracture morphology, initial encounter     -Orthopedic Surgery consulted     -X ray from outlying facility reviewed     -surgery planned today    -As needed ice,acetaminophen, and Toradol ordered.    -NPO after midnight    -Encourage incentive spirometry    -Falls precaution    -Neurovascular checks    -  PT when cleared by ortho    - dvt prophylaxis per ortho     #Acute on chronic combined systolic and diastolic CHF   #CAD s/p recent MI    -Vital signs per policy    -I's and O's    -echo on 5/2021: EF 35% with LV diastolic dysfunction    -Continue home furosemide, Imdur, and potassium    -Patient on home oxygen    -Continue oxygen per nasal cannula.  To maintain oxygenation above 90%    - proBNP 12,282, patient appears euvolemic    - troponin 0.026, will trend    - cardiology to assess pre and post op as patient high cardiac risk for surgery     #BPH    -Continue home Flomax     #Hypertension    -Continue home lisinopril, metoprolol    -Vital signs per policy     #Severe malnutrition     -Nutritional consult      #Alzheimer's dementia without behavioral disturbance    -Safety precautions     -Reorient when necessary    #Leukocytosis    -white count 15.9, suspect reactive    -patient hemodynamically stable    -follow labs       DVT  prophylaxis:  Mechanical DVT prophylaxis orders are present.    CODE STATUS:    Limited Support to NOT Include: Intubation; Cardioversion/Defibrillation  Level Of Support Discussed With: Next of Kin (If No Surrogate)  Code Status: No CPR  Medical Interventions (Level of Support Prior to Arrest): Limited      Disposition:  I expect patient to be discharged in 1-2 days    This patient has been examined wearing appropriate Personal Protective Equipment and discussed with Patient. 08/22/21      Electronically signed by Jerome Flaherty DO, 08/22/21, 14:08 EDT.  [facility] Hospitalist Team

## 2021-08-22 NOTE — PLAN OF CARE
Goal Outcome Evaluation:   Patient is alert/confused. Patient is very [place]. Speech is garbled at times. Responsive to yes or no questions. Patient has sleep off and on this shift. Takes oxygen off and pulls at lines and clothing.

## 2021-08-22 NOTE — ANESTHESIA PROCEDURE NOTES
Airway  Urgency: elective    Date/Time: 8/22/2021 2:25 PM  End Time:8/22/2021 2:25 PM  Airway not difficult    General Information and Staff    Patient location during procedure: OR  Anesthesiologist: Kike Mariee MD    Indications and Patient Condition  Indications for airway management: airway protection    Preoxygenated: yes  MILS maintained throughout  Mask difficulty assessment: 1 - vent by mask    Final Airway Details  Final airway type: endotracheal airway      Successful airway: ETT  Cuffed: yes   Successful intubation technique: direct laryngoscopy  Endotracheal tube insertion site: oral  Blade: Jazmyn  Blade size: 3  ETT size (mm): 7.0  Cormack-Lehane Classification: grade I - full view of glottis  Placement verified by: chest auscultation and capnometry   Measured from: gums  ETT/EBT to gums (cm): 21  Number of attempts at approach: 1  Assessment: lips, teeth, and gum same as pre-op and atraumatic intubation    Additional Comments  ATRAUMATIC X1 UDVC.  CUFF TO MINIMUM OCCLUSIVE CUFF PRESSURE. POS ETCO2. BS=BS. GAUZE BITE BLOCK

## 2021-08-22 NOTE — CONSULTS
Inpatient Orthopedic Surgery Consult  Consult performed by: Donnie Fontaine MD  Consult ordered by: Yuliana Cash APRN            Referring Provider: Emergency room  Reason for Consultation: Right femoral neck fracture    Patient Care Team:  Mau Mcarthur MD as PCP - General (Family Medicine)      Subjective .     History of present illness:  Tariq Dominique is a 86 y.o. male who presents with right femoral neck fracture following home.  He stays with his sister who takes care of him.  He walks minimally.  He has advanced dementia.  He has been on Eliquis in the past for his heart but has been off for several weeks.  He is on Plavix and aspirin according to the sister..  Patient cannot give any history    Review of Systems:    Patient cannot give history      History  Past Medical History:   Diagnosis Date   • CHF (congestive heart failure) (CMS/[facility]) 05/01/2021   • Dementia due to Alzheimer's disease (CMS/[facility])    • Hypertension      No past surgical history on file.  No family history on file.   Social History     Tobacco Use   • Smoking status: Never Smoker   • Smokeless tobacco: Never Used   Vaping Use   • Vaping Use: Unknown   Substance Use Topics   • Alcohol use: Never   • Drug use: Never     Medications Prior to Admission   Medication Sig Dispense Refill Last Dose   • aspirin 81 MG chewable tablet Chew 81 mg Daily.      • clopidogrel (PLAVIX) 75 MG tablet Take 1 tablet by mouth Daily. 30 tablet 0    • furosemide (LASIX) 40 MG tablet Take 1 tablet by mouth Daily. 30 tablet 0    • isosorbide mononitrate (IMDUR) 30 MG 24 hr tablet Take 1 tablet by mouth Daily. 30 tablet 0    • lisinopril (PRINIVIL,ZESTRIL) 40 MG tablet Take 40 mg by mouth Daily.      • metoprolol succinate XL (TOPROL-XL) 50 MG 24 hr tablet Take 1 tablet by mouth Daily. 30 tablet 0    • nitroglycerin (NITROSTAT) 0.4 MG SL tablet Place 1 tablet under the tongue Every 5 (Five) Minutes As Needed for Chest Pain (Only if SBP Greater Than  100). Take no more than 3 doses in 15 minutes. 30 tablet 0    • potassium chloride (K-DUR,KLOR-CON) 20 MEQ CR tablet Take 1 tablet by mouth Daily. 30 tablet 0    • tamsulosin (FLOMAX) 0.4 MG capsule 24 hr capsule Take 1 capsule by mouth Daily. 30 capsule 0       Patient has no known allergies.    Scheduled Meds:furosemide, 40 mg, Oral, Daily  isosorbide mononitrate, 30 mg, Oral, Q24H  lisinopril, 40 mg, Oral, Daily  metoprolol succinate XL, 50 mg, Oral, Q24H  potassium chloride, 20 mEq, Oral, Daily  sodium chloride, 10 mL, Intravenous, Q12H  tamsulosin, 0.4 mg, Oral, Daily      Continuous Infusions:   PRN Meds:.•  acetaminophen **OR** acetaminophen **OR** acetaminophen  •  aluminum-magnesium hydroxide-simethicone  •  ketorolac  •  melatonin  •  nitroglycerin  •  ondansetron **OR** ondansetron  •  polyethylene glycol  •  sodium chloride  •  traMADol    Objective     Vital Signs   Vitals:    08/21/21 2154 08/22/21 0020 08/22/21 0448 08/22/21 0811   BP:  138/69 171/86 176/57   BP Location:  Left arm Left leg Left leg   Patient Position:  Lying Lying Lying   Pulse:  67 68 106   Resp:  15 18 16   Temp:   98.9 °F (37.2 °C) 98.7 °F (37.1 °C)   TempSrc:   Axillary Axillary   SpO2: 91% 90% 94% 90%   Weight:       Height:             Physical Exam:   Pleasant male, no apparent distress, alert but disoriented, thin  Right hip shortened externally rotated.  Seems to have some pain with motion.  No swelling or bruising.  Cannot feel distal pulses.  Moves toes to command.  Cannot test sensation well  X-ray shows displaced right femoral neck fracture    Results Review:   I reviewed the patient's new clinical results.  I reviewed the patient's new imaging results    Lab Results (last 24 hours)     Procedure Component Value Units Date/Time    Troponin [[id number]]  (Normal) Collected: 08/21/21 2223    Specimen: Blood Updated: 08/22/21 0933     Troponin T 0.026 ng/mL     Narrative:      Troponin T Reference Range:  <= 0.03 ng/mL-    Negative for AMI  >0.03 ng/mL-     Abnormal for myocardial necrosis.  Clinicians would have to utilize clinical acumen, EKG, Troponin and serial changes to determine if it is an Acute Myocardial Infarction or myocardial injury due to an underlying chronic condition.       Results may be falsely decreased if patient taking Biotin.      COVID PRE-OP / PRE-PROCEDURE SCREENING ORDER (NO ISOLATION) - Swab, Nasopharynx [[id number]]  (Normal) Collected: 08/22/21 0733    Specimen: Swab from Nasopharynx Updated: 08/22/21 0830    Narrative:      The following orders were created for panel order COVID PRE-OP / PRE-PROCEDURE SCREENING ORDER (NO ISOLATION) - Swab, Nasopharynx.  Procedure                               Abnormality         Status                     ---------                               -----------         ------                     COVID-19,CEPHEID/MAREN/BD...[[id number]]  Normal              Final result                 Please view results for these tests on the individual orders.    COVID-19,CEPHEID/MAREN/BDMAX,COR/MISTY/PAD/BRIANA IN-HOUSE(OR EMERGENT/ADD-ON),NP SWAB IN TRANSPORT MEDIA 3-4 HR TAT, RT-PCR - Swab, Nasopharynx [[id number]]  (Normal) Collected: 08/22/21 0733    Specimen: Swab from Nasopharynx Updated: 08/22/21 0830     COVID19 Not Detected    Narrative:      Fact sheet for providers: https://www.fda.gov/media/306915/download     Fact sheet for patients: https://www.fda.gov/media/525381/download  Fact sheet for providers: https://www.fda.gov/media/140365/download     Fact sheet for patients: https://www.fda.gov/media/677889/download    Basic Metabolic Panel [[id number]]  (Abnormal) Collected: 08/21/21 2223    Specimen: Blood Updated: 08/21/21 2249     Glucose 162 mg/dL      BUN 20 mg/dL      Creatinine 1.05 mg/dL      Sodium 137 mmol/L      Potassium 4.3 mmol/L      Chloride 101 mmol/L      CO2 26.0 mmol/L      Calcium 8.4 mg/dL      eGFR Non African Amer 67 mL/min/1.73      BUN/Creatinine Ratio 19.0      Anion Gap 10.0 mmol/L     Narrative:      GFR Normal >60  Chronic Kidney Disease <60  Kidney Failure <15      Phosphorus [[id number]]  (Normal) Collected: 08/21/21 2223    Specimen: Blood Updated: 08/21/21 2249     Phosphorus 3.3 mg/dL     Magnesium [[id number]]  (Normal) Collected: 08/21/21 2223    Specimen: Blood Updated: 08/21/21 2249     Magnesium 1.8 mg/dL     BNP [[id number]]  (Abnormal) Collected: 08/21/21 2223    Specimen: Blood Updated: 08/21/21 2247     proBNP 12,282.0 pg/mL     Narrative:      Among patients with dyspnea, NT-proBNP is highly sensitive for the detection of acute congestive heart failure. In addition NT-proBNP of <300 pg/ml effectively rules out acute congestive heart failure with 99% negative predictive value.    Results may be falsely decreased if patient taking Biotin.      Protime-INR [[id number]]  (Abnormal) Collected: 08/21/21 2223    Specimen: Blood Updated: 08/21/21 2240     Protime 11.8 Seconds      INR 1.07    CBC & Differential [[id number]]  (Abnormal) Collected: 08/21/21 2223    Specimen: Blood Updated: 08/21/21 2231    Narrative:      The following orders were created for panel order CBC & Differential.  Procedure                               Abnormality         Status                     ---------                               -----------         ------                     CBC Auto Differential[[id number]]        Abnormal            Final result                 Please view results for these tests on the individual orders.    CBC Auto Differential [[id number]]  (Abnormal) Collected: 08/21/21 2223    Specimen: Blood Updated: 08/21/21 2231     WBC 15.90 10*3/mm3      RBC 4.00 10*6/mm3      Hemoglobin 11.9 g/dL      Hematocrit 34.9 %      MCV 87.2 fL      MCH 29.6 pg      MCHC 34.0 g/dL      RDW 15.9 %      RDW-SD 48.6 fl      MPV 7.1 fL      Platelets 130 10*3/mm3      Neutrophil % 86.6 %      Lymphocyte % 6.8 %      Monocyte % 5.6 %      Eosinophil % 0.1 %      Basophil % 0.9 %       Neutrophils, Absolute 13.80 10*3/mm3      Lymphocytes, Absolute 1.10 10*3/mm3      Monocytes, Absolute 0.90 10*3/mm3      Eosinophils, Absolute 0.00 10*3/mm3      Basophils, Absolute 0.10 10*3/mm3      nRBC 0.0 /100 WBC           Imaging Results (Last 24 Hours)     Procedure Component Value Units Date/Time    XR Pelvis 1 or 2 View [[id number]] Collected: 08/22/21 0938     Updated: 08/22/21 0942    Narrative:      DATE OF EXAM:  8/22/2021 8:54 AM     PROCEDURE:  XR PELVIS 1 OR 2 VW-     INDICATIONS:  Right hip fracture scheduled for surgery today.     COMPARISON:  Abdomen radiographs 5/7/2021 and 5/5/2021.     TECHNIQUE:   An AP radiologic view of the pelvis was obtained.     FINDINGS:  Mildly displaced fracture of the right femoral neck with up to  approximately 2 cm proximal displacement of the distal fracture  fragment. Mild bilateral hip and SI joint DJD. Partially imaged lumbar  spondylosis. Large colorectal stool burden.        Impression:         1. Mildly displaced fracture of the right femoral neck.  2. Large colorectal stool burden, indicating constipation.     Electronically Signed By-Valdemar Diane MD On:8/22/2021 9:39 AM  This report was finalized on [id number] by  Valdemar Diane MD.            Assessment/Plan     Right displaced femoral neck fracture    Discussed with sister that this would be best with a endoprosthetic replacement to get him mobilized and for pain control.  Family understands risk.  These include bleeding, infection, damage to nerves or blood vessels, leg length discrepancy, dislocation, possible need for further surgery, and the risk of medical or anesthetic complications including the risk of death.      Donnie Fontaine MD  08/22/21  10:21 EDT

## 2021-08-22 NOTE — OP NOTE
HIP HEMIARTHROPLASTY  Procedure Report    Patient Name:  Tariq Dominique  YOB: 1935    Date of Surgery:  8/22/2021         Pre-op Diagnosis: Right femoral neck fracture    Post-op diagnosis: Same    Indication: 86-year-old white male with dementia who fell and sustained above-mentioned fracture.  Indicated to replace this to get the patient comfortable and mobilized      Procedure/CPT® Codes:      Procedure(s):  HIP HEMIARTHROPLASTY, right  Staff:  Surgeon(s):  Donnie Fontaine MD    .    Anesthesia: General    Estimated Blood Loss: 250 mL    Implants:    Implant Name Type Inv. Item Serial No.  Lot No. LRB No. Used Action   HD ENDO MODULAR UNITRAX 52MM - [id number] Implant HD ENDO MODULAR UNITRAX 52MM  [employer]ICOL[employer]ASMINE [id number] Right 1 Implanted   STEM HIP OMNIFIT HFX SZ8 140MM 132D - [id number] Implant STEM HIP OMNIFIT HFX SZ8 140MM 132D  NICOLAS JASMINE [id number] Right 1 Implanted   SLV C/TPR UNITRAX MIN3 - [id number] Implant SLV C/TPR UNITRAX MIN3  NICOLAS JASMINE [id number] Right 1 Implanted       Specimen:          None        Findings: Displaced fracture    Complications: Zero    Description of Procedure: Patient was seen in the holding room and consented for procedure.  Patient had the operative extremity initialed.  Patient had preoperative antibiotic 2 g Kefzol.  Patient received preop tranexamic acid 1000 mg.  Patient was taken to the operating room and had anesthesia.  Patient was positioned in the decubitus position with the operative side up.  Axillary roll was used.  The arms were on arm boards on the nonoperative side.  Patient had a Fischer catheter.  Patient had pubic and sacral braces holding them in the lateral position.  The operative hip was prepped and draped sterilely.  The incision was pre-injected with     20 cc of a mixture of 30 cc of half percent ropivacaine with 30 cc of saline and half cc 1% epinephrine.  The posterior approach to the hip was made with the incision curving  proximally and posteriorly.  Fascia was split in line with the incision.  The Charnley retractor was placed superficial to the sciatic nerve.  The hip was opened up from the quadratus  femoris to the piriformis .  The capsule was opened up superiorly.  The hip was internally rotated 90 degrees and a femoral neck cut was made 1.5 cm proximal to the lesser trochanter at a 45 degree angle to the shaft.  The head was removed with a corkscrew and measured at 52 mm.  The Tippr Omnifit hip system was used.  The trial ball corresponding to the measured head was inserted with good suction fit.  The canal was then prepared with a box chisel, canal finder, and broaches starting at size 4 and going to size press-fit eight with good fit.  The trial reduction was done off this with a -3 neck length and head size as mentioned above.  Leg lengths were good and stability was good.  The canal was then prepared with cement polyethylene plug.  Canal was brushed, irrigated, and dried.  The acetabulum was protected with a sponge which was removed after cementing.  Two batches of Pura Simplex P cement was mixed under vacuum conditions.  When in semi-liquid state, was retrograde injected with a cement nozzle down the canal.  The above-mentioned stem with centralizer was placed on the cement mantle in anteversion matching the anatomy.  The head and neck assembly was impacted under the clean trunnion then reduced into the clean acetabulum. This was held in a neutral position to test for stability and leg length.  Excess cement was trimmed off with the plastic curettes.  The hip was thoroughly irrigated and the capsule was repaired back with #2 Ethibond suture.  Piriformis was closed back with #2 Ethibond.  The fascia was then closed back with #2 Vicryl interrupted and the subcu with 2-0 Vicryl interrupted.  The skin with staples.  Sterile dressings were applied.  An abduction pillow was applied and patient was left stable in the  operating room.  Will be weightbearing as tolerated with total hip precautions    Donnie Fontaine MD     Date: 8/22/2021  Time: 15:41 EDT

## 2021-08-22 NOTE — H&P
[facility]      Patient Name: Tariq Dominique  : 1935  MRN: [id number]  Primary Care Physician:  Mau Mcarthur MD  Date of admission: 2021      Subjective      Chief Complaint: Thigh and hip pain    History of Present Illness: Tariq Dominique is a 86 y.o. male with a past medical history of acute on chronic combined systolic and diastolic CHF, advanced Alzheimer's dementia without behavioral disturbances, previous COVID-19 the patient, severe malnutrition, history of non-STEMI myocardial infarction who presented to [facility] on 2021 from an outlying facility after family brought patient in for evaluation after a fall at home and complaint of pain right hip right thigh.  Patient is very demented, he is alert but very disoriented, he is unable to describe pain, or verbalize increased or relief of pain.  He does state his hip hurts.  He denies any fever, cough, dizziness, chest pain, nausea vomiting diarrhea or abdominal pain.  Per family patient is DNR.  Hospitalist was consulted for medical management.  Orthopedic surgery to see tomorrow.  .  Review of Systems   Reason unable to perform ROS: Patient severly demented         Personal History     Past Medical History:   Diagnosis Date   • CHF (congestive heart failure) (CMS/[facility]) 2021   • Dementia due to Alzheimer's disease (CMS/[facility])    • Hypertension        No past surgical history on file.    Family History: family history is not on file. Otherwise pertinent FHx was reviewed and not pertinent to current issue.    Social History:  reports that he has never smoked. He has never used smokeless tobacco. He reports that he does not drink alcohol and does not use drugs.    Home Medications:  Prior to Admission Medications     Prescriptions Last Dose Informant Patient Reported? Taking?    aspirin 81 MG chewable tablet   Yes No    Chew 81 mg Daily.    clopidogrel (PLAVIX) 75 MG tablet   No No     Take 1 tablet by mouth Daily.    furosemide (LASIX) 40 MG tablet   No No    Take 1 tablet by mouth Daily.    isosorbide mononitrate (IMDUR) 30 MG 24 hr tablet   No No    Take 1 tablet by mouth Daily.    lisinopril (PRINIVIL,ZESTRIL) 40 MG tablet   Yes No    Take 40 mg by mouth Daily.    metoprolol succinate XL (TOPROL-XL) 50 MG 24 hr tablet   No No    Take 1 tablet by mouth Daily.    nitroglycerin (NITROSTAT) 0.4 MG SL tablet   No No    Place 1 tablet under the tongue Every 5 (Five) Minutes As Needed for Chest Pain (Only if SBP Greater Than 100). Take no more than 3 doses in 15 minutes.    potassium chloride (K-DUR,KLOR-CON) 20 MEQ CR tablet   No No    Take 1 tablet by mouth Daily.    tamsulosin (FLOMAX) 0.4 MG capsule 24 hr capsule   No No    Take 1 capsule by mouth Daily.            Allergies:  No Known Allergies    Objective      Vitals:   Temp:  [98.2 °F (36.8 °C)] 98.2 °F (36.8 °C)  Heart Rate:  [82] 82  Resp:  [18] 18  BP: (153)/(100) 153/100  Flow (L/min):  [2] 2    Physical Exam  Constitutional:       Appearance: He is not ill-appearing.   HENT:      Head: Normocephalic and atraumatic.      Nose: Nose normal.      Mouth/Throat:      Mouth: Mucous membranes are moist.   Eyes:      Pupils: Pupils are equal, round, and reactive to light.   Cardiovascular:      Rate and Rhythm: Normal rate and regular rhythm.      Pulses: Normal pulses.      Heart sounds: Normal heart sounds.   Pulmonary:      Effort: Pulmonary effort is normal.      Breath sounds: Normal breath sounds.   Abdominal:      General: Abdomen is flat. Bowel sounds are normal. There is no distension.      Palpations: Abdomen is soft.      Tenderness: There is no abdominal tenderness.   Musculoskeletal:         General: Tenderness present.      Cervical back: Normal range of motion and neck supple. No rigidity.      Right hip: Tenderness present. Decreased range of motion. Decreased strength.        Legs:    Lymphadenopathy:      Cervical: No cervical  adenopathy.   Skin:     General: Skin is warm and dry.      Capillary Refill: Capillary refill takes less than 2 seconds.      Coloration: Skin is pale.      Findings: Bruising and rash present.   Neurological:      Mental Status: He is alert. He is disoriented.   Psychiatric:         Mood and Affect: Mood normal.         Behavior: Behavior normal.         Thought Content: Thought content normal.         Judgment: Judgment normal.          Result Review    Result Review:  I have personally reviewed the results from the time of this admission to 8/21/2021 23:25 EDT and agree with these findings:  []  Laboratory  []  Microbiology  []  Radiology  []  EKG/Telemetry   []  Cardiology/Vascular   []  Pathology  [x]  Old records  [x]  Other:    X-ray from [facility].   Fracture proximal right femur with significant overlap and shortening and anterior displacement distal fracture fragment .   Covid-19 not detected at [facility] 08/21/2021 1610  No labs reported         Assessment/Plan        Active Hospital Problems:  Active Hospital Problems    Diagnosis    • **Closed fracture of right femur, unspecified fracture morphology, initial encounter (CMS/[facility])    • Alzheimer's dementia without behavioral disturbance (CMS/[facility])    • Severe malnutrition (CMS/[facility])    • Acute on chronic combined systolic and diastolic CHF (congestive heart failure) (CMS/[facility])      Plan:     Closed fracture of right femur, unspecified fracture morphology, initial encounter   -Orthopedic Surgery consulted   -X ray from [facility] facility reviewed   -CBC, CMP, BNP, PT/INR, mag , phosphorus ordered  -X-ray pelvis 1-2 views ordered  -As needed ice,acetaminophen, and Toradol ordered.  -NPO after midnight  -Encourage incentive spirometry  -Falls precaution  -Neurovascular checks  -Bedrest    Acute on chronic combined systolic and diastolic CHF   -Vital signs per policy  -I's and O's  -Heart healthy diet  -Continue home furosemide, Imdur, and  potassium  -Patient on home oxygen  -Continue oxygen per nasal cannula.  To maintain oxygenation above 90%    BPH  -Continue home Flomax    -Hypertension  -Continue home lisinopril, metoprolol  -Vital signs per policy    Severe malnutrition   -Nutritional consult     -Alzheimer's dementia without behavioral disturbance  -Safety precautions   -Reorient when necessary    DVT prophylaxis:  Mechanical DVT prophylaxis orders are present.    CODE STATUS:    Limited Support to NOT Include: Intubation; Cardioversion/Defibrillation  Level Of Support Discussed With: Next of Kin (If No Surrogate)  Code Status: No CPR  Medical Interventions (Level of Support Prior to Arrest): Limited    Admission Status:  I believe this patient meets inpatient status.    I discussed the patient's findings and my recommendations with patient, family and nursing staff.    This patient has been examined wearing appropriate Personal Protective Equipment. 08/21/21      Signature: Electronically signed by HUSSEIN Sylvester, 08/21/21, 11:26 PM EDT.

## 2021-08-22 NOTE — ANESTHESIA PREPROCEDURE EVALUATION
Anesthesia Evaluation     Patient summary reviewed and Nursing notes reviewed   NPO Solid Status: > 8 hours  NPO Liquid Status: > 8 hours           Airway   Mallampati: I  TM distance: >3 FB  Neck ROM: full  No difficulty expected  Dental - normal exam     Pulmonary - negative pulmonary ROS and normal exam   Cardiovascular - normal exam    (+) hypertension, past MI , CHF ,       Neuro/Psych  (+) psychiatric history, dementia,     GI/Hepatic/Renal/Endo - negative ROS     Musculoskeletal (-) negative ROS    Abdominal  - normal exam    Bowel sounds: normal.   Substance History - negative use     OB/GYN negative ob/gyn ROS         Other        ROS/Med Hx Other: ALZHEIMERS                  Anesthesia Plan    ASA 4     general   (PATIENT UNCOOPERATIVE, COMBATIVE, DEMENTED.  NO ERAS MEDS OR BLOCK)  intravenous induction     Anesthetic plan, all risks, benefits, and alternatives have been provided, discussed and informed consent has been obtained with: patient.

## 2021-08-22 NOTE — PLAN OF CARE
Goal Outcome Evaluation:      Pt Resting in bed at this time. Surgery bath complete. Both sisters/POA are at bedside. Consent signed. Othro called sisters and went over procedure with them. Dr. Flaherty ordered for cardiology to see pt. Pt does not see a cardiologist. Plan for hip surgery this afternoon.

## 2021-08-22 NOTE — CONSULTS
"Cardiology Consult Note      REQUESTING PHYSICIAN    Jerome Flaherty DO    PATIENT IDENTIFICATION  Name: Tariq Dominique  Age: 86 y.o.  Sex: male  :  1935  MRN: [id number]             REASON FOR CONSULTATION:  86-year-old male with medical history that includes chronic combined systolic and diastolic heart failure, advanced Alzheimer's dementia, severe malnutrition, history of non-ST elevation MI, essential hypertension.    TTE 2021: Akinetic distal anterior wall apex and distal septum, EF 35%      CC:  EKG shows sinus rhythm with frequent PACs    HISTORY OF PRESENT ILLNESS:   Patient presented to [facility] 2021 following a fall at home.  X-ray confirmed mildly displaced fracture of the right femoral neck with up to approximately 2 cm proximal displacement of the distal fracture fragment.  Additional work-up includes troponin 0.026, proBNP 12,282.  WBC 15.9, hemoglobin 11.9.  COVID-19 swab negative    Cardiology consulted at 1:57 PM today for presurgical cardiac risk assessment  Arrived in patient's room to evaluateapproximately  2:30 PM-nurse states patient was taken to the OR early    Patient was seen postoperatively.  He has significant dementia and is essentially noncommunicative.  No arrhythmias have been noted.  Discussed with nursing.      REVIEW OF SYSTEMS:  Review of systems could not be obtained due to   Patient taken to the OR prior to cardiology assessment    OBJECTIVE       ASSESSMENT  Right femoral neck fracture  Severe dilated cardiomyopathy  Alzheimer's dementia  Malnutrition  Essential hypertension  History of non-STEMI  History of congestive heart failure      PLAN  Patient did well with surgical correction of hip fracture.  Continue to monitor rhythm and electrolytes.        Vital Signs  Visit Vitals  /57 (BP Location: Left leg, Patient Position: Lying)   Pulse 106   Temp 98.7 °F (37.1 °C) (Axillary)   Resp 16   Ht 175.2 cm (68.98\")   Wt 59.7 kg (131 lb 9.8 oz) " "  SpO2 90%   BMI 19.45 kg/m²     Physical exam    General: Alert, cooperative, no distress, appears stated age  Head:  Normocephalic, atraumatic, mucous membranes moist  Eyes:  Conjunctiva/corneas clear, EOM's intact     Neck:  Supple,  no bruit  Lungs: Coarse and diminished bilaterally.  Chest wall: No tenderness  Heart::  Regular rate and rhythm, S1 and S2 normal, 1/6 holosystolic murmur.  No rub or gallop  Abdomen: Soft, non-tender, nondistended bowel sounds active  Extremities: Right hip bandage  Pulses: 2+ and symmetric all extremities  Skin:  No rashes or lesions  Neuro/psych: Significant dementia.  Picking at clothing and IVs.      Oxygen Therapy  SpO2: 90 %  Pulse Oximetry Type: Intermittent  Device (Oxygen Therapy): room air  Flow (L/min): 2  Flowsheet Rows        First Filed Value   Admission Height  175.2 cm (68.98\") Documented at 08/21/2021 2044   Admission Weight  59.7 kg (131 lb 9.8 oz) Documented at 08/21/2021 2044          Intake & Output (last 3 days)       None          Lines, Drains & Airways      Active LDAs       Name:   Placement date:   Placement time:   Site:   Days:    Peripheral IV 08/21/21 2200 Anterior;Proximal;Right Forearm   08/21/21 2200    Forearm   less than 1                    MEDICAL HISTORY    Past Medical History:   Diagnosis Date   • CHF (congestive heart failure) (CMS/Formerly [facility]) 05/01/2021   • Dementia due to Alzheimer's disease (CMS/Formerly [facility])    • Hypertension         SURGICAL HISTORY    History reviewed. No pertinent surgical history.     FAMILY HISTORY    History reviewed. No pertinent family history.    SOCIAL HISTORY    Social History     Tobacco Use   • Smoking status: Never Smoker   • Smokeless tobacco: Never Used   Substance Use Topics   • Alcohol use: Never        ALLERGIES    No Known Allergies           /57 (BP Location: Left leg, Patient Position: Lying)   Pulse 106   Temp 98.7 °F (37.1 °C) (Axillary)   Resp 16   Ht 175.2 cm (68.98\")   Wt 59.7 kg (131 lb 9.8 oz) "   SpO2 90%   BMI 19.45 kg/m²   Intake/Output last 3 shifts:  No intake/output data recorded.  Intake/Output this shift:  No intake/output data recorded.    PHYSICAL EXAM:    Physical exam not performed as patient was taken to the OR    Scheduled Meds:      bisacodyl, 10 mg, Rectal, Daily  ceFAZolin, 2 g, Intravenous, Once  furosemide, 40 mg, Oral, Daily  isosorbide mononitrate, 30 mg, Oral, Q24H  lisinopril, 40 mg, Oral, Daily  metoprolol succinate XL, 50 mg, Oral, Q24H  potassium chloride, 20 mEq, Oral, Daily  sodium chloride, 10 mL, Intravenous, Q12H  tamsulosin, 0.4 mg, Oral, Daily  tranexamic acid, 1,000 mg, Intravenous, Once        Continuous Infusions:         PRN Meds:    •  acetaminophen **OR** acetaminophen **OR** acetaminophen  •  aluminum-magnesium hydroxide-simethicone  •  ketorolac  •  melatonin  •  nitroglycerin  •  ondansetron **OR** ondansetron  •  polyethylene glycol  •  sodium chloride  •  traMADol        Results Review:     I reviewed the patient's new clinical results.    CBC    Results from last 7 days   Lab Units 08/21/21 2223   WBC 10*3/mm3 15.90*   HEMOGLOBIN g/dL 11.9*   PLATELETS 10*3/mm3 130*     Cr Clearance Estimated Creatinine Clearance: 42.6 mL/min (by C-G formula based on SCr of 1.05 mg/dL).  Coag   Results from last 7 days   Lab Units 08/21/21 2223   INR  1.07     HbA1C   Lab Results   Component Value Date    HGBA1C 5.5 05/02/2021     Blood Glucose No results found for: POCGLU  Infection     CMP   Results from last 7 days   Lab Units 08/21/21  2223   SODIUM mmol/L 137   POTASSIUM mmol/L 4.3   CHLORIDE mmol/L 101   CO2 mmol/L 26.0   BUN mg/dL 20   CREATININE mg/dL 1.05   GLUCOSE mg/dL 162*     ABG      UA      LOU  No results found for: POCMETH, POCAMPHET, POCBARBITUR, POCBENZO, POCCOCAINE, POCOPIATES, POCOXYCODO, POCPHENCYC, POCPROPOXY, POCTHC, POCTRICYC  Lysis Labs   Results from last 7 days   Lab Units 08/21/21  2223   INR  1.07   HEMOGLOBIN g/dL 11.9*   PLATELETS 10*3/mm3 130*    CREATININE mg/dL 1.05     Radiology(recent) XR Pelvis 1 or 2 View    Result Date: 8/22/2021   1. Mildly displaced fracture of the right femoral neck. 2. Large colorectal stool burden, indicating constipation.  Electronically Signed By-Valdemar Diane MD On:8/22/2021 9:39 AM This report was finalized on [id number] by  Valdemar Diane MD.        Results from last 7 days   Lab Units 08/21/21  2223   TROPONIN T ng/mL 0.026       Xrays, labs reviewed personally by physician.    ECG/EMG Results (most recent)       Procedure Component Value Units Date/Time    ECG 12 Lead [[id number]] Collected: 08/22/21 1101     Updated: 08/22/21 1102     QT Interval 426 ms     Narrative:      HEART RATE= 77  bpm  RR Interval= 800  ms  PA Interval= 208  ms  P Horizontal Axis= 16  deg  P Front Axis= 34  deg  QRSD Interval= 117  ms  QT Interval= 426  ms  QRS Axis= -55  deg  T Wave Axis= 64  deg  - ABNORMAL ECG -  Sinus rhythm  Atrial premature complexes  LAD, consider left anterior fascicular block  Left ventricular hypertrophy  Electronically Signed By:   Date and Time of Study: 2021-08-22 11:01:07              Medication Review:   I have reviewed the patient's current medication list  Scheduled Meds:bisacodyl, 10 mg, Rectal, Daily  ceFAZolin, 2 g, Intravenous, Once  furosemide, 40 mg, Oral, Daily  isosorbide mononitrate, 30 mg, Oral, Q24H  lisinopril, 40 mg, Oral, Daily  metoprolol succinate XL, 50 mg, Oral, Q24H  potassium chloride, 20 mEq, Oral, Daily  sodium chloride, 10 mL, Intravenous, Q12H  tamsulosin, 0.4 mg, Oral, Daily  tranexamic acid, 1,000 mg, Intravenous, Once      Continuous Infusions:   PRN Meds:.•  acetaminophen **OR** acetaminophen **OR** acetaminophen  •  aluminum-magnesium hydroxide-simethicone  •  ketorolac  •  melatonin  •  nitroglycerin  •  ondansetron **OR** ondansetron  •  polyethylene glycol  •  sodium chloride  •  traMADol    Imaging:  Imaging Results (Last 72 Hours)       Procedure Component Value Units Date/Time  "   XR Pelvis 1 or 2 View [[id number]] Collected: 08/22/21 0938     Updated: 08/22/21 0942    Narrative:      DATE OF EXAM:  8/22/2021 8:54 AM     PROCEDURE:  XR PELVIS 1 OR 2 VW-     INDICATIONS:  Right hip fracture scheduled for surgery today.     COMPARISON:  Abdomen radiographs 5/7/2021 and 5/5/2021.     TECHNIQUE:   An AP radiologic view of the pelvis was obtained.     FINDINGS:  Mildly displaced fracture of the right femoral neck with up to  approximately 2 cm proximal displacement of the distal fracture  fragment. Mild bilateral hip and SI joint DJD. Partially imaged lumbar  spondylosis. Large colorectal stool burden.        Impression:         1. Mildly displaced fracture of the right femoral neck.  2. Large colorectal stool burden, indicating constipation.     Electronically Signed By-Valdemar Diane MD On:8/22/2021 9:39 AM  This report was finalized on [id number] by  Valdemar Diane MD.              HUSSEIN Malin  08/22/21  14:33 EDT       EMR Dragon/Transcription:   \"Dictated utilizing Dragon dictation\".                 Electronically signed by HUSSEIN Malin, 08/22/21, 2:33 PM EDT.    Cardiology attending:  As above.  Agree with assessment plan.  Seen examined.  Chart and labs reviewed.  Note scribed by APC and reviewed for accuracy with above changes noted.  Patient was taken early to the operating theater prior to cardiology evaluation.  Patient was seen postoperatively.  He was alert but essentially noncommunicative secondary to his significant dementia.  No significant arrhythmias have been noted.  Patient does have a history of cardiomyopathy with an ejection fraction of about 35%.  We will continue to monitor his rhythm.  Heart regular.    "

## 2021-08-22 NOTE — ANESTHESIA PROCEDURE NOTES
Peripheral IV    Patient location during procedure: OR  Start time: 8/22/2021 2:21 PM  End time: 8/22/2021 2:21 PM  Line placed for Fluids/Medication Admin.  Performed By   Anesthesiologist: Kike Mariee MD  Preanesthetic Checklist  Completed: patient identified, IV checked, site marked, risks and benefits discussed, surgical consent, monitors and equipment checked, pre-op evaluation and timeout performed  Peripheral IV Prep   Patient position: supine   Prep: ChloraPrep  Patient monitoring: heart rate, cardiac monitor and continuous pulse ox  Peripheral IV Procedure   Laterality:right  Location:  Forearm  Catheter size: 22 G         Post Assessment   Dressing Type: tape.    IV Dressing/Site: clean, dry and intact

## 2021-08-23 NOTE — ANESTHESIA POSTPROCEDURE EVALUATION
Patient: Tariq Dominique    Procedure Summary     Date: 08/22/21 Room / Location: [facility] OR  / [facility] MAIN OR    Anesthesia Start: 1415 Anesthesia Stop: 1539    Procedure: HIP HEMIARTHROPLASTY (Right Hip) Diagnosis:     Surgeons: Donnie Fontaine MD Provider: Kike Mariee MD    Anesthesia Type: general ASA Status: 4          Anesthesia Type: general    Vitals  Vitals Value Taken Time   /73 08/22/21 1701   Temp 97.9 °F (36.6 °C) 08/22/21 1655   Pulse 63 08/22/21 1704   Resp 14 08/22/21 1655   SpO2 100 % 08/22/21 1704   Vitals shown include unvalidated device data.        Post Anesthesia Care and Evaluation    Patient location during evaluation: PACU  Patient participation: complete - patient participated  Level of consciousness: awake  Pain scale: See nurse's notes for pain score.  Pain management: adequate  Airway patency: patent  Anesthetic complications: No anesthetic complications  PONV Status: none  Cardiovascular status: acceptable  Respiratory status: acceptable  Hydration status: acceptable    Comments: Patient seen and examined postoperatively; vital signs stable; SpO2 greater than or equal to 90%; cardiopulmonary status stable; nausea/vomiting adequately controlled; pain adequately controlled; no apparent anesthesia complications; patient discharged from anesthesia care when discharge criteria were met

## 2021-08-23 NOTE — PROGRESS NOTES
LOS: 2 days   Patient Care Team:  Mau Mcarthur MD as PCP - General (Family Medicine)        Subjective     Postop day 1 status post right hip endoprosthesis, patient has dementia and cannot give any history      Objective     Vital Signs  Vitals:    08/23/21 0100 08/23/21 0527 08/23/21 0737 08/23/21 1122   BP: 97/59 132/78 155/96 117/76   BP Location: Left arm Left arm Left arm Left arm   Patient Position: Lying Lying Lying Lying   Pulse: 86 97 86 88   Resp: 17 16 17 16   Temp: 98.1 °F (36.7 °C) 97.9 °F (36.6 °C) 98.4 °F (36.9 °C) 97.9 °F (36.6 °C)   TempSrc: Oral Axillary Axillary Oral   SpO2: 90% 90% 90% 90%   Weight:  60.4 kg (133 lb 2.5 oz)     Height:           Physical Exam:   Resting comfortably.  Leg rotation good.  X-ray shows good cementless right hip endoprosthesis with equal leg lengths     Results Review:     I reviewed the patient's new clinical results.  I reviewed the patient's new imaging results    Lab Results (last 24 hours)     Procedure Component Value Units Date/Time    Protime-INR [[id number]]  (Normal) Collected: 08/23/21 0348    Specimen: Blood Updated: 08/23/21 0436     Protime 11.6 Seconds      INR 1.05    Phosphorus [[id number]]  (Normal) Collected: 08/23/21 0348    Specimen: Blood Updated: 08/23/21 0432     Phosphorus 4.2 mg/dL     Basic Metabolic Panel [[id number]]  (Abnormal) Collected: 08/23/21 0348    Specimen: Blood Updated: 08/23/21 0432     Glucose 164 mg/dL      BUN 31 mg/dL      Creatinine 1.46 mg/dL      Sodium 141 mmol/L      Potassium 5.1 mmol/L      Chloride 107 mmol/L      CO2 23.0 mmol/L      Calcium 8.1 mg/dL      eGFR Non African Amer 46 mL/min/1.73      BUN/Creatinine Ratio 21.2     Anion Gap 11.0 mmol/L     Narrative:      GFR Normal >60  Chronic Kidney Disease <60  Kidney Failure <15      Magnesium [[id number]]  (Normal) Collected: 08/23/21 0348    Specimen: Blood Updated: 08/23/21 0427     Magnesium 1.9 mg/dL     CBC & Differential [[id number]]  (Abnormal)  Collected: 08/23/21 0348    Specimen: Blood Updated: 08/23/21 0407    Narrative:      The following orders were created for panel order CBC & Differential.  Procedure                               Abnormality         Status                     ---------                               -----------         ------                     CBC Auto Differential[[id number]]        Abnormal            Final result                 Please view results for these tests on the individual orders.    CBC Auto Differential [[id number]]  (Abnormal) Collected: 08/23/21 0348    Specimen: Blood Updated: 08/23/21 0407     WBC 10.40 10*3/mm3      RBC 3.82 10*6/mm3      Hemoglobin 11.5 g/dL      Hematocrit 34.0 %      MCV 89.0 fL      MCH 30.2 pg      MCHC 34.0 g/dL      RDW 16.2 %      RDW-SD 50.3 fl      MPV 7.7 fL      Platelets 109 10*3/mm3      Neutrophil % 77.2 %      Lymphocyte % 13.6 %      Monocyte % 8.4 %      Eosinophil % 0.0 %      Basophil % 0.8 %      Neutrophils, Absolute 8.00 10*3/mm3      Lymphocytes, Absolute 1.40 10*3/mm3      Monocytes, Absolute 0.90 10*3/mm3      Eosinophils, Absolute 0.00 10*3/mm3      Basophils, Absolute 0.10 10*3/mm3      nRBC 0.0 /100 WBC     Troponin [[id number]]  (Abnormal) Collected: 08/22/21 2009    Specimen: Blood Updated: 08/22/21 2053     Troponin T 0.065 ng/mL     Narrative:      Troponin T Reference Range:  <= 0.03 ng/mL-   Negative for AMI  >0.03 ng/mL-     Abnormal for myocardial necrosis.  Clinicians would have to utilize clinical acumen, EKG, Troponin and serial changes to determine if it is an Acute Myocardial Infarction or myocardial injury due to an underlying chronic condition.       Results may be falsely decreased if patient taking Biotin.      Troponin [[id number]]  (Abnormal) Collected: 08/22/21 1752    Specimen: Blood Updated: 08/22/21 1832     Troponin T 0.065 ng/mL     Narrative:      Troponin T Reference Range:  <= 0.03 ng/mL-   Negative for AMI  >0.03 ng/mL-     Abnormal for  myocardial necrosis.  Clinicians would have to utilize clinical acumen, EKG, Troponin and serial changes to determine if it is an Acute Myocardial Infarction or myocardial injury due to an underlying chronic condition.       Results may be falsely decreased if patient taking Biotin.      Blood Gas, Arterial - [[id number]]  (Abnormal) Collected: 08/22/21 1628    Specimen: Arterial Blood Updated: 08/22/21 1631     Site Right Radial     Joey's Test Positive     pH, Arterial 7.325 pH units      pCO2, Arterial 44.3 mm Hg      pO2, Arterial 79.9 mm Hg      HCO3, Arterial 23.1 mmol/L      Base Excess, Arterial -3.0 mmol/L      Comment: Serial Number: [id number]:  [id number]        O2 Saturation, Arterial 94.7 %      CO2 Content 24.4 mmol/L      Barometric Pressure for Blood Gas --     Comment: N/A        Modality Oxy Mask     FIO2 100 %      Hemodilution No        Imaging Results (Last 24 Hours)     Procedure Component Value Units Date/Time    XR Hip With or Without Pelvis 2 - 3 View Right [[id number]] Collected: 08/22/21 1801     Updated: 08/22/21 1804    Narrative:      DATE OF EXAM:  8/22/2021 5:40 PM     PROCEDURE:  XR HIP W OR WO PELVIS 2-3 VIEW RIGHT-     INDICATIONS:  Post-Op Hip Arthroplasty partial     COMPARISON:  08/22/2021.     TECHNIQUE:   AP and Lateral views of the right hip and one view of the pelvis were  obtained.     FINDINGS:  Postoperative changes from right hip bipolar hemiarthroplasty placement  with the hardware in its expected position and without evidence of  hardware complications. No evidence of acute fracture or dislocation.  Likely postoperative air in the soft tissues. Anterolateral surgical  skin staples.        Impression:      Right hip bipolar hemiarthroplasty placement with the hardware in its  expected position.     Electronically Signed By-Valdemar Diane MD On:8/22/2021 6:01 PM  This report was finalized on [id number] by  Valdemar Diane MD.            Medication Review:   Scheduled  Meds:aspirin, 325 mg, Oral, Daily  bisacodyl, 10 mg, Rectal, Daily  clopidogrel, 75 mg, Oral, Daily  furosemide, 40 mg, Oral, Daily  isosorbide mononitrate, 30 mg, Oral, Q24H  metoprolol succinate XL, 50 mg, Oral, Q24H  polyethylene glycol, 17 g, Oral, Daily  potassium chloride, 20 mEq, Oral, Daily  sodium chloride, 10 mL, Intravenous, Q12H  sodium chloride, 3 mL, Intravenous, Q12H  tamsulosin, 0.4 mg, Oral, Daily      Continuous Infusions:sodium chloride, 125 mL/hr, Last Rate: 125 mL/hr (08/23/21 0318)      PRN Meds:.•  acetaminophen **OR** acetaminophen **OR** acetaminophen  •  acetaminophen  •  aluminum-magnesium hydroxide-simethicone  •  HYDROcodone-acetaminophen  •  ketorolac  •  melatonin  •  Morphine **AND** naloxone  •  Morphine **AND** naloxone  •  nitroglycerin  •  ondansetron **OR** ondansetron  •  oxyCODONE  •  polyethylene glycol  •  promethazine  •  sodium chloride  •  sodium chloride  •  traMADol      Assessment/Plan     Stable from right hip endoprosthesis    Plan for disposition: Okay to discharge to rehab facility tomorrow.  May remove staples on August 31.  May shower on August 27.  Return to see me in 2-3 weeks with x-ray    Donnie Fontaine MD  08/23/21  12:08 EDT

## 2021-08-23 NOTE — THERAPY EVALUATION
Patient Name: Tariq Dominique  : 1935    MRN: [id number]                              Today's Date: 2021       Admit Date: 2021    Visit Dx: No diagnosis found.  Patient Active Problem List   Diagnosis   • NSTEMI (non-ST elevated myocardial infarction) (CMS/[facility])   • Acute on chronic combined systolic and diastolic CHF (congestive heart failure) (CMS/[facility])   • Leukocytosis   • Hyperglycemia   • Severe malnutrition (CMS/[facility])   • COVID-19 virus detected   • Closed fracture of right femur, unspecified fracture morphology, initial encounter (CMS/[facility])   • Alzheimer's dementia without behavioral disturbance (CMS/[facility])     Past Medical History:   Diagnosis Date   • CHF (congestive heart failure) (CMS/[facility]) 2021   • Dementia due to Alzheimer's disease (CMS/[facility])    • Hypertension      History reviewed. No pertinent surgical history.  General Information     Row Name 21 1041          Physical Therapy Time and Intention    Document Type  evaluation  -     Mode of Treatment  physical therapy  -     Row Name 21 1041          General Information    Patient Profile Reviewed  yes  -     Prior Level of Function  -- sisters assisted with transfers, utilized w/c for mobility around the house, ambulated a few steps with sisters, sisters assisted with bathing/dressing/toileting, pt was able to feed himself.  -     Existing Precautions/Restrictions  fall;left;hip, posterior  -     Row Name 21 1041          Living Environment    Lives With  sibling(s)  -     Row Name 21 1041          Home Main Entrance    Number of Stairs, Main Entrance  one  -     Row Name 21 1041          Cognition    Orientation Status (Cognition)  -- alert but oriented x0  -     Row Name 21 1041          Safety Issues, Functional Mobility    Safety Issues Affecting Function (Mobility)  safety precaution awareness;insight into deficits/self-awareness;at risk behavior observed;positioning of  assistive device;awareness of need for assistance;problem-solving;impulsivity  -HC     Impairments Affecting Function (Mobility)  balance;cognition;postural/trunk control;range of motion (ROM);endurance/activity tolerance;strength;pain  -HC     Cognitive Impairments, Mobility Safety/Performance  safety precaution awareness;impulsivity;insight into deficits/self-awareness;judgment;problem-solving/reasoning  -       User Key  (r) = Recorded By, (t) = Taken By, (c) = Cosigned By    Initials Name Provider Type     Nicloasa Freeman, PT Physical Therapist        Mobility     Row Name 08/23/21 1043          Bed Mobility    Bed Mobility  supine-sit;sit-supine;rolling right;rolling left;scooting/bridging  -     Rolling Left [place] (Bed Mobility)  maximum assist (25% patient effort)  -     Rolling Right [place] (Bed Mobility)  maximum assist (25% patient effort)  -     Scooting/Bridging [place] (Bed Mobility)  2 person assist;maximum assist (25% patient effort)  -     Supine-Sit [place] (Bed Mobility)  2 person assist;maximum assist (25% patient effort)  -     Sit-Supine [place] (Bed Mobility)  2 person assist;maximum assist (25% patient effort)  -     Row Name 08/23/21 1043          Bed-Chair Transfer    Bed-Chair [place] (Transfers)  unable to assess  -     Row Name 08/23/21 1043          Sit-Stand Transfer    Sit-Stand [place] (Transfers)  unable to assess  -     Row Name 08/23/21 1043          Gait/Stairs (Locomotion)    [place] Level (Gait)  unable to assess  -     Row Name 08/23/21 1043          Mobility    Extremity Weight-bearing Status  right lower extremity  -     Right Lower Extremity (Weight-bearing Status)  weight-bearing as tolerated (WBAT) THP  -       User Key  (r) = Recorded By, (t) = Taken By, (c) = Cosigned By    Initials Name Provider Type    Nicolasa Guevara, PT Physical Therapist        Obj/Interventions     Row Name 08/23/21 1044           Range of Motion Comprehensive    Comment, General Range of Motion  BUEs PROM WFL, left LE PROM WFL, right LE PROM limited by THP and pain  -     Row Name 08/23/21 1044          Strength Comprehensive (MMT)    Comment, General Manual Muscle Testing (MMT) Assessment  unable to formally assess due to impaired cognition  -     Row Name 08/23/21 1044          Balance    Balance Assessment  sitting static balance;sitting dynamic balance  -     Comment, Balance  Pt requiring modA to sit EOB x3 minutes  -     Row Name 08/23/21 1044          Sensory Assessment (Somatosensory)    Sensory Assessment (Somatosensory)  unable/difficult to assess  -       User Key  (r) = Recorded By, (t) = Taken By, (c) = Cosigned By    Initials Name Provider Type     Nicolasa Freeman, PT Physical Therapist        Goals/Plan     Row Name 08/23/21 1047          Bed Mobility Goal 1 (PT)    Activity/Assistive Device (Bed Mobility Goal 1, PT)  bed mobility activities, all  -HC     [place] Level/Cues Needed (Bed Mobility Goal 1, PT)  minimum assist (75% or more patient effort)  -HC     Time Frame (Bed Mobility Goal 1, PT)  2 weeks  -     Row Name 08/23/21 1047          Transfer Goal 1 (PT)    Activity/Assistive Device (Transfer Goal 1, PT)  transfers, all  -HC     [place] Level/Cues Needed (Transfer Goal 1, PT)  minimum assist (75% or more patient effort)  -HC     Time Frame (Transfer Goal 1, PT)  2 weeks  -     Row Name 08/23/21 1047          Patient Education Goal (PT)    Activity (Patient Education Goal, PT)  Pt will be able to sit EOB x5 minutes with CGA to prepare for transfers.  -HC     Time Frame (Patient Education Goal, PT)  2 weeks  -       User Key  (r) = Recorded By, (t) = Taken By, (c) = Cosigned By    Initials Name Provider Type    HC Nicolasa Freeman, PT Physical Therapist        Clinical Impression     Row Name 08/23/21 1045          Pain    Additional Documentation  Pain Scale: FACES  Pre/Post-Treatment (Group)  -     Row Name 08/23/21 1045          Pain Scale: FACES Pre/Post-Treatment    Pain: FACES Scale, Pretreatment  4-->hurts little more  -HC     Posttreatment Pain Rating  4-->hurts little more  -HC     Pre/Posttreatment Pain Comment  right LE pain  -HC     Row Name 08/23/21 1045          Plan of Care Review    Outcome Summary  Pt is 85 yo male admitted after fall at home with closed right femoral neck fx.  Pt now s/p right hip hemiarthroplasty on 8/22/2021 with orders for WBAT and THP on right LE.  PMH: dementia, acute CHF, HTN.  -     Row Name 08/23/21 1045          Therapy Assessment/Plan (PT)    Patient/Family Therapy Goals Statement (PT)  sisters want pt to increase strength  -     Rehab Potential (PT)  fair, will monitor progress closely  -     Criteria for Skilled Interventions Met (PT)  yes;skilled treatment is necessary  -     Predicted Duration of Therapy Intervention (PT)  until d/c  -     Row Name 08/23/21 104          Positioning and Restraints    Pre-Treatment Position  in bed  -HC     Post Treatment Position  bed  -HC     In Bed  notified nsg;call light within reach;encouraged to call for assist;exit alarm on  -HC       User Key  (r) = Recorded By, (t) = Taken By, (c) = Cosigned By    Initials Name Provider Type    HC Nicolasa Freeman, PT Physical Therapist        Outcome Measures     Row Name 08/23/21 1040          How much help from another person do you currently need...    Turning from your back to your side while in flat bed without using bedrails?  2  -HC     Moving from lying on back to sitting on the side of a flat bed without bedrails?  2  -HC     Moving to and from a bed to a chair (including a wheelchair)?  1  -HC     Standing up from a chair using your arms (e.g., wheelchair, bedside chair)?  1  -HC     Climbing 3-5 steps with a railing?  1  -HC     To walk in hospital room?  1  -HC     AM-PAC 6 Clicks Score (PT)  8  -HC     Row Name 08/23/21 1041           Modified Po Scale    Modified [place] Scale  5 - Severe disability.  Bedridden, incontinent, and requiring constant nursing care and attention.  -     Row Name 08/23/21 1047          Functional Assessment    Outcome Measure Options  AM-PAC 6 Clicks Basic Mobility (PT);Modified [place]  -       User Key  (r) = Recorded By, (t) = Taken By, (c) = Cosigned By    Initials Name Provider Type    HC Nicolasa Freeman, PT Physical Therapist                       Physical Therapy Education                 Title: PT OT SLP Therapies (In Progress)     Topic: Physical Therapy (In Progress)     Point: Mobility training (In Progress)     Learning Progress Summary           Patient Acceptance, E, NR by  at 8/23/2021 1048   Family Acceptance, E, NR by HC at 8/23/2021 1048                   Point: Precautions (In Progress)     Learning Progress Summary           Patient Acceptance, E, NR by  at 8/23/2021 1048   Family Acceptance, E, NR by HC at 8/23/2021 1048                               User Key     Initials Effective Dates Name Provider Type Discipline     06/16/21 -  Nicolasa Freeman, PT Physical Therapist PT              PT Recommendation and Plan  Planned Therapy Interventions (PT): balance training, bed mobility training, neuromuscular re-education, ROM (range of motion), strengthening, patient/family education, postural re-education, transfer training, gait training  Plan of Care Reviewed With: patient  Outcome Summary: Pt is 87 yo male admitted after fall at home with closed right femoral neck fx.  Pt now s/p right hip hemiarthroplasty on 8/22/2021 with orders for WBAT and THP on right LE.  PMH: dementia, acute CHF, HTN.  Pt presents with significantly impaired cognition and unable to state name.  Pt reporting pain with bed mobility, however no other verbalizations noted during treatment session.  Pt requiring maxAx2 for safety with bed mobility. Pt able to sit EOB x3 minutes with modA for postural  control.  Pt is not safe to return home with elderly sisters at current level of function due to weakness, impaired balance, and high risk for falls.  Pt will need extensive IP rehab to address deficits.  PT will follow 5x/week while at [facility].     Time Calculation:   PT Charges     Row Name 08/23/21 1051             Time Calculation    Start Time  1012  -      Stop Time  1037  -      Time Calculation (min)  25 min  -      PT Received On  08/23/21  -      PT - Next Appointment  08/24/21  -      PT Goal Re-Cert Due Date  09/06/21  -         Time Calculation- PT    Total Timed Code Minutes- PT  10 minute(s)  -        User Key  (r) = Recorded By, (t) = Taken By, (c) = Cosigned By    Initials Name Provider Type     Nicolasa Freeman, PT Physical Therapist        Therapy Charges for Today     Code Description Service Date Service Provider Modifiers Qty    [id number]  PT EVAL MOD COMPLEXITY 3 8/23/2021 Nicolasa Freeman, PT GP 1    [id number]  PT THERAPEUTIC ACT EA 15 MIN 8/23/2021 Nicolasa Freeman, PT GP 1          PT G-Codes  Outcome Measure Options: AM-PAC 6 Clicks Basic Mobility (PT), Modified [place]  AM-PAC 6 Clicks Score (PT): 8  Modified [place] Scale: 5 - Severe disability.  Bedridden, incontinent, and requiring constant nursing care and attention.    Nicolasa Freeman, PT  8/23/2021

## 2021-08-23 NOTE — PLAN OF CARE
"Goal Outcome Evaluation:  Mr. Dominique is an 87 y/o male who presents to [facility] after a fall at home and c/o right hip and right thigh pain. Pt was admitted and underwent right hip hemiarthroplasty on 8/22/21. PMH significant for dementia, acute CHF, and HTN. ST familiar with pt and last saw pt on  5/5/21 with rec of puree/thins. Pt on regular diet this admission and ST orders placed d/t \"choking on foods from regular diet.\"    Upon entry, pt nonverbal with SLP but appropriately participated with all trials given. Pt fed by SLP and was given ice chips and boost by straw. Pt displayed adequate labial seal with spoon and straw and able to pull all trials with no anterior loss. Pt unable to pull from cup. Intermittent throat clearing with ice chips. Wet and gunky cough reaction observed with all trials of boost by straw. Suspect weak laryngeal elevation per palpation. Wet respiratory patterns noted following trials. No further trials given d/t s/s of difficulty and aspiration. Rec pt be NPO until pt can participate in a VFSS tomorrow AM. Discussed results and recs with pts RN   "

## 2021-08-23 NOTE — PLAN OF CARE
Goal Outcome Evaluation:            Pt is resting in bed. No signs of pain at this time. PT was able to sit him on side of the bed. Renal US ordered.

## 2021-08-23 NOTE — PLAN OF CARE
Goal Outcome Evaluation:      Patients VSS throughout the night, patient has not voided and remains on continuous IV fluids, bladder scan completed per RN and showed 240cc in bladder, patient did have bowel movement. Patient refused to take medication for RN.  No family called or at bedside for RN to update on information

## 2021-08-23 NOTE — PROGRESS NOTES
[facility] Medicine Services Daily Progress Note    Patient Name: Tariq Dominique  : 1935  MRN: [id number]  Primary Care Physician:  Mau Mcarthur MD  Date of admission: 2021      Subjective      Chief Complaint: right femoral neck fracture    Date    2021  Patient severely demented, does not appear to be in distress.  Ortho plans surgery today.  Patient is high cardiac risk for surgery but family in agreement with surgery.    2021 seen and examined at bedside family is at bedside as well.  Discussed with RN who says patient has not had much urine output.  He has been bladder scanned with scanner reading 0 mL.  Creatinine going up.    ROS   Unable to obtain due to dementia      Objective      Vitals:   Temp:  [97.5 °F (36.4 °C)-98.4 °F (36.9 °C)] 98.4 °F (36.9 °C)  Heart Rate:  [66-97] 86  Resp:  [14-17] 17  BP: ()/() 155/96  Flow (L/min):  [4-10] 4  Physical Exam   Physical exam from 2021  Constitutional: AO x0 awake no distress  HENT:   Head: Normocephalic and atraumatic.   Eyes: Conjunctivae and EOM are normal. Pupils are equal, round, and reactive to light.   Neck: No JVD present. No thyromegaly present.   Cardiovascular: Normal rate, regular rhythm, normal heart sounds and intact distal pulses. Exam reveals no gallop and no friction rub.   No murmur heard.  Pulmonary/Chest: Effort normal and breath sounds normal. No stridor. No respiratory distress.  has no wheezes.  has no rales.  exhibits no tenderness.   Abdominal: Soft. Bowel sounds are normal.  no distension and no mass. There is no tenderness. There is no rebound and no guarding. No hernia.   Musculoskeletal: Decreased range of motion in the right hip, no edema in lower extremities  Lymphadenopathy:     no cervical adenopathy.   Neurological: Moving all extremities, AO x0 moving all extremities awake.  Skin: No rash noted.  not diaphoretic.   Psychiatric:   No distress unable to fully  examine due to advanced dementia  Vitals reviewed.         Result Review    Result Review:  I have personally reviewed the results from the time of this admission to 8/23/2021 10:13 EDT and agree with these findings:  [x]  Laboratory  []  Microbiology  [x]  Radiology  []  EKG/Telemetry   [x]  Cardiology/Vascular   []  Pathology  []  Old records  []  Other: Creatinine going up.       Wounds (last 24 hours)      LDA Wound     Row Name 08/23/21 0716 08/22/21 2030 08/22/21 1735       Wound 08/21/21 2010 Left index finger Abrasion    Wound - Properties Group Placement Date: 08/21/21  -RD Placement Time: 2010 -RD Present on Hospital Admission: Y  -RD Side: Left  -RD Location: index finger  -RD Primary Wound Type: Abrasion  -RD    Dressing Appearance  dry;intact;no drainage  -KS  dry;intact  -LL  dry;intact  -SL    Closure  None  -KS  --  --    Base  --  dry  -LL  --    Drainage Amount  none  -KS  --  --    Retired Wound - Properties Group Date first assessed: 08/21/21 -RD Time first assessed: 2010 -RD Present on Hospital Admission: Y  -RD Side: Left  -RD Location: index finger  -RD Primary Wound Type: Abrasion  -RD       Wound 08/22/21 1517 Right anterior thigh    Wound - Properties Group Placement Date: 08/22/21  -SC Placement Time: 1517  -SC Side: Right  -SC Orientation: anterior  -SC Location: thigh  -SC    Dressing Appearance  dry;intact;no drainage  -KS  dry;intact  -LL  dry;intact  -SL    Drainage Amount  none  -KS  --  --    Retired Wound - Properties Group Date first assessed: 08/22/21  -SC Time first assessed: 1517  -SC Side: Right  -SC Location: thigh  -SC    Row Name 08/22/21 1541 08/22/21 1517          Wound 08/21/21 2010 Left index finger Abrasion    Wound - Properties Group Placement Date: 08/21/21  -RD Placement Time: 2010 -RD Present on Hospital Admission: Y  -RD Side: Left  -RD Location: index finger  -RD Primary Wound Type: Abrasion  -RD    Retired Wound - Properties Group Date first assessed:  08/21/21  - Time first assessed: 2010 -RD Present on Hospital Admission: Y  -RD Side: Left  -RD Location: index finger  -RD Primary Wound Type: Abrasion  -RD       Wound 08/22/21 1517 Right anterior thigh    Wound - Properties Group Placement Date: 08/22/21  -SC Placement Time: 1517 -SC Side: Right  -SC Orientation: anterior  -SC Location: thigh  -SC    Dressing Appearance  dry;intact;no drainage  -KR  --     Drainage Amount  none  -KR  --     Dressing Care  --  dressing applied xeroform, 4*4, foam tape, abduction pillow  -SC     Retired Wound - Properties Group Date first assessed: 08/22/21  -SC Time first assessed: 1517  -SC Side: Right  -SC Location: thigh  -SC      User Key  (r) = Recorded By, (t) = Taken By, (c) = Cosigned By    Initials Name Provider Type    SC Cruz Freeman, RN Registered Nurse    Carrie Sorensen RN Registered Nurse    Niurka Puckett RN Registered Nurse    Kayla Mackey RN Registered Nurse    Flor Wray RN Registered Nurse    Shalini Matthews RN Registered Nurse            Assessment/Plan      Brief Patient Summary:  86-year-old came in with fall and hip fracture.  Underwent ORIF.      aspirin, 325 mg, Oral, Daily  bisacodyl, 10 mg, Rectal, Daily  clopidogrel, 75 mg, Oral, Daily  furosemide, 40 mg, Oral, Daily  isosorbide mononitrate, 30 mg, Oral, Q24H  metoprolol succinate XL, 50 mg, Oral, Q24H  polyethylene glycol, 17 g, Oral, Daily  potassium chloride, 20 mEq, Oral, Daily  sodium chloride, 10 mL, Intravenous, Q12H  sodium chloride, 3 mL, Intravenous, Q12H  tamsulosin, 0.4 mg, Oral, Daily       sodium chloride, 125 mL/hr, Last Rate: 125 mL/hr (08/23/21 0318)         Active Hospital Problems:  Active Hospital Problems    Diagnosis    • **Closed fracture of right femur, unspecified fracture morphology, initial encounter (CMS/[facility])    • Alzheimer's dementia without behavioral disturbance (CMS/[facility])    • Severe malnutrition (CMS/[facility])    • Acute on chronic  combined systolic and diastolic CHF (congestive heart failure) (CMS/[facility])      Plan:   Right femur fracture  Orthopedic surgery on board and patient on surgical correction  PT OT  Pain control    Acute kidney injury  With oliguria  Hold ACE inhibitor,  Check renal usg  Continue to do periodic bladder scan and for now we will continue Lasix with IV fluids to promote diuresis.    CAD  Status post MI  Continue home meds  Cardiology following    BPH  Continue Flomax  Monitor for obstructive symptoms.  Serial bladder scans.    Hypertension  Hold lisinopril continue metoprolol    Troponin elevation  Flat  Cardiology on board  Continue CAD meds    Alzheimer's dementia  Follow-up with neurology outpatient    DVT PUD prophylaxis    Plan as above    DVT prophylaxis:  Mechanical DVT prophylaxis orders are present.    CODE STATUS:    Limited Support to NOT Include: Intubation; Cardioversion/Defibrillation  Level Of Support Discussed With: Next of Kin (If No Surrogate)  Code Status: No CPR  Medical Interventions (Level of Support Prior to Arrest): Limited      Disposition: 24 to 48 hours discharge plan    Electronically signed by Jenni Bradford MD, 08/23/21, 10:13 EDT.  [facility] Hospitalist Team

## 2021-08-23 NOTE — DISCHARGE PLACEMENT REQUEST
"Tariq Parson (86 y.o. Male)     Date of Birth Social Security Number Address Home Phone MRN    1935  [id number] [place] [address] [phone] [id number]    Adventism Marital Status          None Single       Admission Date Admission Type Admitting Provider Attending Provider Department, Room/Bed    8/21/21 Urgent Jenni Bradford MD Piwko, Radomir, MD [facility] SURGICAL INPATIENT, 4129/1    Discharge Date Discharge Disposition Discharge Destination                       Attending Provider: Jenni Bradford MD    Allergies: No Known Allergies    Isolation: None   Infection: None   Code Status: No CPR    Ht: 175.2 cm (68.98\")   Wt: 60.4 kg (133 lb 2.5 oz)    Admission Cmt: None   Principal Problem: Closed fracture of right femur, unspecified fracture morphology, initial encounter (CMS/[facility]) [S72.91XA]                 Active Insurance as of 8/21/2021     Primary Coverage     Payor Plan Insurance Group Employer/Plan Group    ANTHEM MEDICARE REPLACEMENT ANTHEM MEDICARE ADVANTAGE [id number]     Payor Plan Address Payor Plan Phone Number Payor Plan Fax Number Effective Dates    [address] [phone]  1/1/2020 - None Entered    [facility] [id number]       Subscriber Name Subscriber Birth Date Member ID       TARIQ PARSON 1935 [id number]           Secondary Coverage     Payor Plan Insurance Group Employer/Plan Group    [place]      Payor Plan Address Payor Plan Phone Number Payor Plan Fax Number Effective Dates    [address]   4/1/2021 - None Entered    [place] [address]       Subscriber Name Subscriber Birth Date Member ID       TARIQ PARSON 1935 [id number]                 Emergency Contacts      (Rel.) Home Phone Work Phone Mobile Phone    Trip, Gloria"Fede\" (Vivian) (Sister) -- -- [phone]    OSWALDO PARSON (Sister) [phone] -- [phone]               History & Physical      Yuliana Cash, APRN at 08/21/21 " 2133     Attestation signed by Lenin Das MD at 21 1858        Agree with above mentioned except my evaluation, assessment, plan and treatment supercedes that of ARNP or PA.      Electronically signed by Lenin Das MD, 21, 6:58 PM EDT.                      [facility] Medicine Services      Patient Name: Tariq Dominique  : 1935  MRN: [id number]  Primary Care Physician:  Mau Mcarthur MD  Date of admission: 2021      Subjective      Chief Complaint: Thigh and hip pain    History of Present Illness: Tariq Dominique is a 86 y.o. 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  Patient is very demented, he is alert but very disoriented, he is unable to describe pain, or verbalize increased or relief of pain.  He does state his hip hurts.  He denies any fever, cough, dizziness, chest pain, nausea vomiting diarrhea or abdominal pain.  Per family patient is DNR.  Hospitalist was consulted for medical management.  Orthopedic surgery to see tomorrow.  .  Review of Systems   Reason unable to perform ROS: Patient severly demented         Personal History     Past Medical History:   Diagnosis Date   • CHF (congestive heart failure) (CMS/[facility]) 2021   • Dementia due to Alzheimer's disease (CMS/[facility])    • Hypertension        No past surgical history on file.    Family History: family history is not on file. Otherwise pertinent FHx was reviewed and not pertinent to current issue.    Social History:  reports that he has never smoked. He has never used smokeless tobacco. He reports that he does not  drink alcohol and does not use drugs.    Home Medications:  Prior to Admission Medications     Prescriptions Last Dose Informant Patient Reported? Taking?    aspirin 81 MG chewable tablet   Yes No    Chew 81 mg Daily.    clopidogrel (PLAVIX) 75 MG tablet   No No    Take 1 tablet by mouth Daily.    furosemide (LASIX) 40 MG tablet   No No    Take 1 tablet by mouth Daily.    isosorbide mononitrate (IMDUR) 30 MG 24 hr tablet   No No    Take 1 tablet by mouth Daily.    lisinopril (PRINIVIL,ZESTRIL) 40 MG tablet   Yes No    Take 40 mg by mouth Daily.    metoprolol succinate XL (TOPROL-XL) 50 MG 24 hr tablet   No No    Take 1 tablet by mouth Daily.    nitroglycerin (NITROSTAT) 0.4 MG SL tablet   No No    Place 1 tablet under the tongue Every 5 (Five) Minutes As Needed for Chest Pain (Only if SBP Greater Than 100). Take no more than 3 doses in 15 minutes.    potassium chloride (K-DUR,KLOR-CON) 20 MEQ CR tablet   No No    Take 1 tablet by mouth Daily.    tamsulosin (FLOMAX) 0.4 MG capsule 24 hr capsule   No No    Take 1 capsule by mouth Daily.            Allergies:  No Known Allergies    Objective      Vitals:   Temp:  [98.2 °F (36.8 °C)] 98.2 °F (36.8 °C)  Heart Rate:  [82] 82  Resp:  [18] 18  BP: (153)/(100) 153/100  Flow (L/min):  [2] 2    Physical Exam  Constitutional:       Appearance: He is not ill-appearing.   HENT:      Head: Normocephalic and atraumatic.      Nose: Nose normal.      Mouth/Throat:      Mouth: Mucous membranes are moist.   Eyes:      Pupils: Pupils are equal, round, and reactive to light.   Cardiovascular:      Rate and Rhythm: Normal rate and regular rhythm.      Pulses: Normal pulses.      Heart sounds: Normal heart sounds.   Pulmonary:      Effort: Pulmonary effort is normal.      Breath sounds: Normal breath sounds.   Abdominal:      General: Abdomen is flat. Bowel sounds are normal. There is no distension.      Palpations: Abdomen is soft.      Tenderness: There is no abdominal tenderness.    Musculoskeletal:         General: Tenderness present.      Cervical back: Normal range of motion and neck supple. No rigidity.      Right hip: Tenderness present. Decreased range of motion. Decreased strength.        Legs:    Lymphadenopathy:      Cervical: No cervical adenopathy.   Skin:     General: Skin is warm and dry.      Capillary Refill: Capillary refill takes less than 2 seconds.      Coloration: Skin is pale.      Findings: Bruising and rash present.   Neurological:      Mental Status: He is alert. He is disoriented.   Psychiatric:         Mood and Affect: Mood normal.         Behavior: Behavior normal.         Thought Content: Thought content normal.         Judgment: Judgment normal.          Result Review    Result Review:  I have personally reviewed the results from the time of this admission to 8/21/2021 23:25 EDT and agree with these findings:  []  Laboratory  []  Microbiology  []  Radiology  []  EKG/Telemetry   []  Cardiology/Vascular   []  Pathology  [x]  Old records  [x]  Other:    X-ray from [facility] facility.   Fracture proximal right femur with significant overlap and shortening and anterior displacement distal fracture fragment .   Covid-19 not detected at [facility] facility 08/21/2021 1610  No labs reported         Assessment/Plan        Active Hospital Problems:  Active Hospital Problems    Diagnosis    • **Closed fracture of right femur, unspecified fracture morphology, initial encounter (CMS/[facility])    • Alzheimer's dementia without behavioral disturbance (CMS/[facility])    • Severe malnutrition (CMS/[facility])    • Acute on chronic combined systolic and diastolic CHF (congestive heart failure) (CMS/[facility])      Plan:     Closed fracture of right femur, unspecified fracture morphology, initial encounter   -Orthopedic Surgery consulted   [facility]   -CBC, CMP, BNP, PT/INR, mag , phosphorus ordered  -X-ray pelvis 1-2 views ordered  -As needed ice,acetaminophen, and Toradol  ordered.  -NPO after midnight  -Encourage incentive spirometry  -Falls precaution  -Neurovascular checks  -Bedrest    Acute on chronic combined systolic and diastolic CHF   -Vital signs per policy  -I's and O's  -Heart healthy diet  -Continue home furosemide, Imdur, and potassium  -Patient on home oxygen  -Continue oxygen per nasal cannula.  To maintain oxygenation above 90%    BPH  -Continue home Flomax    -Hypertension  -Continue home lisinopril, metoprolol  -Vital signs per policy    Severe malnutrition   -Nutritional consult     -Alzheimer's dementia without behavioral disturbance  -Safety precautions   -Reorient when necessary    DVT prophylaxis:  Mechanical DVT prophylaxis orders are present.    CODE STATUS:    Limited Support to NOT Include: Intubation; Cardioversion/Defibrillation  Level Of Support Discussed With: Next of Kin (If No Surrogate)  Code Status: No CPR  Medical Interventions (Level of Support Prior to Arrest): Limited    Admission Status:  I believe this patient meets inpatient status.    I discussed the patient's findings and my recommendations with patient, family and nursing staff.    This patient has been examined wearing appropriate Personal Protective Equipment. 08/21/21      Signature: Electronically signed by HUSSEIN Sylvester, 08/21/21, 11:26 PM EDT.      Electronically signed by Lenin Das MD at 08/22/21 1858          Physical Therapy Notes (last 24 hours) (Notes from 08/22/21 1526 through 08/23/21 1526)      Nicolasa Freeman, PT at 08/23/21 1050  Version 1 of 1         Problem: Adult Inpatient Plan of Care  Goal: Plan of Care Review  Outcome: Ongoing, Progressing  Flowsheets  Taken 8/23/2021 1048  Plan of Care Reviewed With: patient  Outcome Summary: Pt is 85 yo male admitted after fall at home with closed right femoral neck fx.  Pt now s/p right hip hemiarthroplasty on 8/22/2021 with orders for WBAT and THP on right LE.  PMH: dementia, acute CHF, HTN.  Pt presents with  significantly impaired cognition and unable to state name.  Pt reporting pain with bed mobility, however no other verbalizations noted during treatment session.  Pt requiring maxAx2 for safety with bed mobility. Pt able to sit EOB x3 minutes with modA for postural control.  Pt is not safe to return home with elderly sisters at current level of function due to weakness, impaired balance, and high risk for falls.  Pt will need extensive IP rehab to address deficits.  PT will follow 5x/week while at [facility].         Electronically signed by Nicolasa Freeman, PT at 21 1051     Nicolasa Freeman, PT at 21 1051  Version 1 of 1         Patient Name: Tariq Dominique  : 1935    MRN: [id number]                              Today's Date: 2021       Admit Date: 2021    Visit Dx: No diagnosis found.  Patient Active Problem List   Diagnosis   • NSTEMI (non-ST elevated myocardial infarction) (CMS/[facility])   • Acute on chronic combined systolic and diastolic CHF (congestive heart failure) (CMS/[facility])   • Leukocytosis   • Hyperglycemia   • Severe malnutrition (CMS/HCC)   • COVID-19 virus detected   • Closed fracture of right femur, unspecified fracture morphology, initial encounter (CMS/[facility])   • Alzheimer's dementia without behavioral disturbance (CMS/[facility])     Past Medical History:   Diagnosis Date   • CHF (congestive heart failure) (CMS/HCC) 2021   • Dementia due to Alzheimer's disease (CMS/[facility])    • Hypertension      History reviewed. No pertinent surgical history.  General Information     Row Name 21 1041          Physical Therapy Time and Intention    Document Type  evaluation  -HC     Mode of Treatment  physical therapy  -     Row Name 21 1041          General Information    Patient Profile Reviewed  yes  -HC     Prior Level of Function  -- sisters assisted with transfers, utilized w/c for mobility around the house, ambulated a few steps with sisters, sisters assisted with  bathing/dressing/toileting, pt was able to feed himself.  -     Existing Precautions/Restrictions  fall;left;hip, posterior  -     Row Name 08/23/21 1041          Living Environment    Lives With  sibling(s)  -     Row Name 08/23/21 1041          Home Main Entrance    Number of Stairs, Main Entrance  one  -HC     Row Name 08/23/21 1041          Cognition    Orientation Status (Cognition)  -- alert but oriented x0  -     Row Name 08/23/21 1041          Safety Issues, Functional Mobility    Safety Issues Affecting Function (Mobility)  safety precaution awareness;insight into deficits/self-awareness;at risk behavior observed;positioning of assistive device;awareness of need for assistance;problem-solving;impulsivity  -     Impairments Affecting Function (Mobility)  balance;cognition;postural/trunk control;range of motion (ROM);endurance/activity tolerance;strength;pain  -HC     Cognitive Impairments, Mobility Safety/Performance  safety precaution awareness;impulsivity;insight into deficits/self-awareness;judgment;problem-solving/reasoning  -       User Key  (r) = Recorded By, (t) = Taken By, (c) = Cosigned By    Initials Name Provider Type    HC Nicolasa Freeman, PT Physical Therapist        Mobility     Row Name 08/23/21 1043          Bed Mobility    Bed Mobility  supine-sit;sit-supine;rolling right;rolling left;scooting/bridging  -     Rolling Left [place] (Bed Mobility)  maximum assist (25% patient effort)  -HC     Rolling Right [place] (Bed Mobility)  maximum assist (25% patient effort)  -HC     Scooting/Bridging [place] (Bed Mobility)  2 person assist;maximum assist (25% patient effort)  -     Supine-Sit [place] (Bed Mobility)  2 person assist;maximum assist (25% patient effort)  -HC     Sit-Supine [place] (Bed Mobility)  2 person assist;maximum assist (25% patient effort)  -     Row Name 08/23/21 1043          Bed-Chair Transfer    Bed-Chair [place] (Transfers)   unable to assess  -Freeman [facility] Name 08/23/21 1043          Sit-Stand Transfer    Sit-Stand [place] (Transfers)  unable to assess  -Freeman [facility] Name 08/23/21 1043          Gait/Stairs (Locomotion)    [place] Level (Gait)  unable to assess  -Freeman [facility] Name 08/23/21 1043          Mobility    Extremity Weight-bearing Status  right lower extremity  -     Right Lower Extremity (Weight-bearing Status)  weight-bearing as tolerated (WBAT) THP  -       User Key  (r) = Recorded By, (t) = Taken By, (c) = Cosigned By    Initials Name Provider Type     Nicolasa Freeman, PT Physical Therapist        Obj/Interventions     Row Name 08/23/21 1044          Range of Motion Comprehensive    Comment, General Range of Motion  BUEs PROM WFL, left LE PROM WFL, right LE PROM limited by THP and pain  -Freeman [facility] Name 08/23/21 1044          Strength Comprehensive (MMT)    Comment, General Manual Muscle Testing (MMT) Assessment  unable to formally assess due to impaired cognition  -Freeman [facility] Name 08/23/21 1044          Balance    Balance Assessment  sitting static balance;sitting dynamic balance  -     Comment, Balance  Pt requiring modA to sit EOB x3 minutes  -Freeman [facility] Name 08/23/21 1044          Sensory Assessment (Somatosensory)    Sensory Assessment (Somatosensory)  unable/difficult to assess  -       User Key  (r) = Recorded By, (t) = Taken By, (c) = Cosigned By    Initials Name Provider Type     Nicolasa Freeman, PT Physical Therapist        Goals/Plan     [facility] Name 08/23/21 1047          Bed Mobility Goal 1 (PT)    Activity/Assistive Device (Bed Mobility Goal 1, PT)  bed mobility activities, all  -     [place] Level/Cues Needed (Bed Mobility Goal 1, PT)  minimum assist (75% or more patient effort)  -     Time Frame (Bed Mobility Goal 1, PT)  2 weeks  -     Row Name 08/23/21 1047          Transfer Goal 1 (PT)    Activity/Assistive Device (Transfer Goal 1, PT)  transfers, all  -HC     [place]  Level/Cues Needed (Transfer Goal 1, PT)  minimum assist (75% or more patient effort)  -     Time Frame (Transfer Goal 1, PT)  2 weeks  -     Row Name 08/23/21 1047          Patient Education Goal (PT)    Activity (Patient Education Goal, PT)  Pt will be able to sit EOB x5 minutes with CGA to prepare for transfers.  -HC     Time Frame (Patient Education Goal, PT)  2 weeks  -       User Key  (r) = Recorded By, (t) = Taken By, (c) = Cosigned By    Initials Name Provider Type     Nicolasa Freeman, PT Physical Therapist        Clinical Impression     Row Name 08/23/21 1045          Pain    Additional Documentation  Pain Scale: FACES Pre/Post-Treatment (Group)  -     Row Name 08/23/21 1045          Pain Scale: FACES Pre/Post-Treatment    Pain: FACES Scale, Pretreatment  4-->hurts little more  -     Posttreatment Pain Rating  4-->hurts little more  -     Pre/Posttreatment Pain Comment  right LE pain  -     Row Name 08/23/21 1045          Plan of Care Review    Outcome Summary  Pt is 85 yo male admitted after fall at home with closed right femoral neck fx.  Pt now s/p right hip hemiarthroplasty on 8/22/2021 with orders for WBAT and THP on right LE.  PMH: dementia, acute CHF, HTN.  -     Row Name 08/23/21 1045          Therapy Assessment/Plan (PT)    Patient/Family Therapy Goals Statement (PT)  sisters want pt to increase strength  -     Rehab Potential (PT)  fair, will monitor progress closely  -     Criteria for Skilled Interventions Met (PT)  yes;skilled treatment is necessary  -     Predicted Duration of Therapy Intervention (PT)  until d/c  -     Row Name 08/23/21 1045          Positioning and Restraints    Pre-Treatment Position  in bed  -     Post Treatment Position  bed  -HC     In Bed  notified nsg;call light within reach;encouraged to call for assist;exit alarm on  -       User Key  (r) = Recorded By, (t) = Taken By, (c) = Cosigned By    Initials Name Provider Type    RUSSELL Freeman  Nicolasa CORTEZ, PT Physical Therapist        Outcome Measures     Row Name 08/23/21 1047          How much help from another person do you currently need...    Turning from your back to your side while in flat bed without using bedrails?  2  -HC     Moving from lying on back to sitting on the side of a flat bed without bedrails?  2  -HC     Moving to and from a bed to a chair (including a wheelchair)?  1  -HC     Standing up from a chair using your arms (e.g., wheelchair, bedside chair)?  1  -HC     Climbing 3-5 steps with a railing?  1  -HC     To walk in hospital room?  1  -     AM-PAC 6 Clicks Score (PT)  8  -     Row Name 08/23/21 1047          Modified [place] Scale    Modified [place] Scale  5 - Severe disability.  Bedridden, incontinent, and requiring constant nursing care and attention.  -     Row Name 08/23/21 1047          Functional Assessment    Outcome Measure Options  AM-PAC 6 Clicks Basic Mobility (PT);Modified [place]  -       User Key  (r) = Recorded By, (t) = Taken By, (c) = Cosigned By    Initials Name Provider Type     Nicolasa Freeman, PT Physical Therapist                       Physical Therapy Education                 Title: PT OT SLP Therapies (In Progress)     Topic: Physical Therapy (In Progress)     Point: Mobility training (In Progress)     Learning Progress Summary           Patient Acceptance, E, NR by  at 8/23/2021 1048   Family Acceptance, E, NR by  at 8/23/2021 1048                   Point: Precautions (In Progress)     Learning Progress Summary           Patient Acceptance, E, NR by  at 8/23/2021 1048   Family Acceptance, E, NR by  at 8/23/2021 1048                               User Key     Initials Effective Dates Name Provider Type Discipline     06/16/21 -  Nicolasa Freeman, PT Physical Therapist PT              PT Recommendation and Plan  Planned Therapy Interventions (PT): balance training, bed mobility training, neuromuscular re-education, ROM (range of motion),  strengthening, patient/family education, postural re-education, transfer training, gait training  Plan of Care Reviewed With: patient  Outcome Summary: Pt is 85 yo male admitted after fall at home with closed right femoral neck fx.  Pt now s/p right hip hemiarthroplasty on 8/22/2021 with orders for WBAT and THP on right LE.  PMH: dementia, acute CHF, HTN.  Pt presents with significantly impaired cognition and unable to state name.  Pt reporting pain with bed mobility, however no other verbalizations noted during treatment session.  Pt requiring maxAx2 for safety with bed mobility. Pt able to sit EOB x3 minutes with modA for postural control.  Pt is not safe to return home with elderly sisters at current level of function due to weakness, impaired balance, and high risk for falls.  Pt will need extensive IP rehab to address deficits.  PT will follow 5x/week while at [facility].     Time Calculation:   PT Charges     Row Name 08/23/21 1051             Time Calculation    Start Time  1012  -      Stop Time  1037  -      Time Calculation (min)  25 min  -      PT Received On  08/23/21  -      PT - Next Appointment  08/24/21  -      PT Goal Re-Cert Due Date  09/06/21  -         Time Calculation- PT    Total Timed Code Minutes- PT  10 minute(s)  -        User Key  (r) = Recorded By, (t) = Taken By, (c) = Cosigned By    Initials Name Provider Type     Nicolasa Freeman, PT Physical Therapist        Therapy Charges for Today     Code Description Service Date Service Provider Modifiers Qty    [id number]  PT EVAL MOD COMPLEXITY 3 8/23/2021 Nicolasa Freeman, PT GP 1    [id number]  PT THERAPEUTIC ACT EA 15 MIN 8/23/2021 Nicolasa Freeman, PT GP 1          PT G-Codes  Outcome Measure Options: AM-PAC 6 Clicks Basic Mobility (PT), Modified [place]  AM-PAC 6 Clicks Score (PT): 8  Modified [place] Scale: 5 - Severe disability.  Bedridden, incontinent, and requiring constant nursing care and attention.    Nicolasa CORTEZ  Pete, PT  8/23/2021      Electronically signed by Nicolasa Freeman, PT at 08/23/21 4987

## 2021-08-23 NOTE — CONSULTS
Nutrition Services    Patient Name: Tariq Dominique  YOB: 1935  MRN: [id number]  Admission date: 8/21/2021    NPO per ST recs.   Consider discussion on goals of care for nutrition.      Severe chronic illness related malnutrition related to likely inadequate calorie intake in the context of advanced dementia as evidenced by current po intake 25% of meals and nutrition focused physical exam revealing overall severe muscle/fat loss.      See MSA at bottom of note.      PPE Documentation        PPE Worn By Provider mask, gloves and eye protection   PPE Worn By Patient  none     CLINICAL NUTRITION ASSESSMENT      Reason for Assessment 8/23/21: MAS consult     H&P:  86 y.o. male with a past medical history of acute on chronic combined systolic and diastolic CHF, advanced Alzheimer's dementia without behavioral disturbances, previous COVID-19 the patient, severe malnutrition, history of non-STEMI myocardial infarction who presented to [facility] on 8/21/2021 from an outlying facility after family brought patient in for evaluation after a fall at home and complaint of pain right hip right thigh.    Past Medical History:   Diagnosis Date   • CHF (congestive heart failure) (CMS/HCC) 05/01/2021   • Dementia due to Alzheimer's disease (CMS/[facility]ly [facility])    • Hypertension        History reviewed. No pertinent surgical history.     Current Problems:   R femur fx  -s/p HIP HEMIARTHROPLASTY, right 8/22    SUGEY    CAD    BPH    HTN    Alzheimer's dementia       Nutrition/Diet History         Narrative     8/23: Visited patient in room- pt did not communicate at all, but awoke when RD called name. No information able to be obtained and no family at bedside. Chart reviewed.    Noted after assessment that ST has evaluated and recommended NPO diet. Unsure of goals of care at this time.     Functional Status Feeds self, assistance with mobility and ADL's     Food Allergies NKFA   Factors Affecting   Nutritional Intake  "Advanced age, dementia     Anthropometrics        Current Height, Weight Height: 175.2 cm (68.98\")  Weight: 60.4 kg (133 lb 2.5 oz) (08/23/21 0527)        Admit Height, Weight -    Flowsheet Rows      First Filed Value   Admission Height  175.2 cm (68.98\") Documented at 08/21/2021 2044   Admission Weight  59.7 kg (131 lb 9.8 oz) Documented at 08/21/2021 2044             Ideal Body Weight (IBW) 160lb   % Ideal Body Weight 83%       Usual Body Weight UTD   % Usual Body Weight UTD   Wt Change Observation -wt down 4% x3 months   Weight Hx    Wt Readings from Last 30 Encounters:   08/23/21 0527 60.4 kg (133 lb 2.5 oz)   08/21/21 2150 59.7 kg (131 lb 9.8 oz)   08/21/21 2044 59.7 kg (131 lb 9.8 oz)   05/08/21 0220 58.9 kg (129 lb 13.6 oz)   05/05/21 0532 58.8 kg (129 lb 10.1 oz)   05/04/21 0502 59 kg (130 lb 1.1 oz)   05/03/21 0454 59 kg (130 lb 1.1 oz)   05/02/21 0810 63 kg (139 lb)   05/02/21 0621 63.3 kg (139 lb 8.8 oz)   05/01/21 2247 63.3 kg (139 lb 8.8 oz)        BMI kg/m2 Body mass index is 19.68 kg/m².       Labs/Medications         Pertinent Labs -   Results from last 7 days   Lab Units 08/23/21  0348 08/21/21 2223   SODIUM mmol/L 141 137   POTASSIUM mmol/L 5.1 4.3   CHLORIDE mmol/L 107 101   CO2 mmol/L 23.0 26.0   BUN mg/dL 31* 20   CREATININE mg/dL 1.46* 1.05   CALCIUM mg/dL 8.1* 8.4*   GLUCOSE mg/dL 164* 162*     Results from last 7 days   Lab Units 08/23/21  0348 08/21/21  2223 08/21/21  2223   MAGNESIUM mg/dL 1.9  --  1.8   PHOSPHORUS mg/dL 4.2   < > 3.3   HEMOGLOBIN g/dL 11.5*   < > 11.9*   HEMATOCRIT % 34.0*   < > 34.9*    < > = values in this interval not displayed.     COVID19   Date Value Ref Range Status   08/22/2021 Not Detected Not Detected - Ref. Range Final     Lab Results   Component Value Date    HGBA1C 5.5 05/02/2021         Pertinent Medications -ASA, Dulcolax, Lasix, Miralax, KCl, IV fluids @125mL/hour     Physical Findings        Overall Physical   Appearance, [facility] 8/23: NFPE completed, RD " "verbalized to patient that physical exam to muscle/fat mass was being performed. See MSA.   --  Edema  none     Gastrointestinal BM 8/23     Tubes none     Oral/Mouth Cavity No issues reported   Skin No pressure injuries noted   -  Estimated/Assessed Needs       Energy Requirements    Height for Calculation  Height: 175.2 cm (68.98\")   Weight for Calculation -60.4kg, CBW   Method for Estimation  -35kcal/kg   EST Needs (kcal/day) -2114kcal/day       Protein Requirements    Weight for Calculation -60.4kg   EST Protein Needs (g/kg) -1.5g/kg   EST Daily Needs (g/day) -91g/day       Fluid Requirements     Estimated Needs (mL/day) -1mL/kcal or per MD       Fluid Deficit    Current Na Level (mEq/L) -   Desired Na Level (mEq/L) -   Estimated Fluid Deficit (L)  -   -  Current Nutrition Orders & Evaluation of Intake       Oral Nutrition     Current PO Diet Diet Regular (noted now NPO per ST)   Supplement -   PO Evaluation     % PO Intake -25% x2 meals   --  Clinical Course    Nutrition Course Details 8/21 HH  8/22 Regular, brief NPO for surgery  8/23 NPO     Nutritional Risk Screening        NRS-2002 Score   -       Nutrition Diagnosis         Nutrition Dx Problem 1 -Severe chronic illness related malnutrition related to likely inadequate calorie intake in the context of advanced dementia as evidenced by current po intake 25% of meals and nutrition focused physical exam revealing overall severe muscle/fat loss.      Nutrition Dx Problem 2 -       Intervention Goal         Intervention Goal(s) -GOC addressed for nutrition     Nutrition Intervention        RD/Tech Action -Workup TF     Nutrition Prescription          Diet Prescription -NPO per ST   Supplement Prescription    -  Enteral Prescription -If EN is needed:  Nutren 1.5 @65mL/hour over 22 hours to provide 2145kcal (101%), 97g protein (107%), 1087mL free water    Pt likely high risk for RFS and will need reduced rate at initiation.       TPN Prescription - "     -  Monitor/Evaluation        Monitor PO intake, Supplement intake, Pertinent labs, Weight, Skin status, GI status, POC/GOC       Malnutrition Severity Assessment      Patient meets criteria for : Severe Malnutrition     Malnutrition Type (last 8 hours)      Malnutrition Severity Assessment     Row Name 08/23/21 1615       Malnutrition Severity Assessment    Malnutrition Type  Chronic Disease - Related Malnutrition    Row Name 08/23/21 1615       Muscle Loss    Loss of Muscle Mass Findings  Severe    Baxter Region  Severe - deep hollowing/scooping, lack of muscle to touch, facial bones well defined    Clavicle Bone Region  Severe - protruding prominent bone    Acromion Bone Region  Severe - squared shoulders, bones, and acromion process protrusion prominent    Scapular Bone Region  -- GARRETT    Dorsal Hand Region  Severe - prominent depression    Patellar Region  Severe - prominent bone, square looking, very little muscle definition    Anterior Thigh Region  Severe - line/depression along thigh, obviously thin    Posterior Calf Region  Severe - thin with very little definition/firmness    Row Name 08/23/21 1615       Fat Loss    Subcutaneous Fat Loss Findings  Severe    Orbital Region   Severe - pronounced hollowness/depression, dark circles, loose saggy skin    Upper Arm Region  -- GARRETT- pt positioning    Thoracic & Lumbar Region  Severe - ribs visible with prominent depressions, iliac crest very prominent    Row Name 08/23/21 1616       Criteria Met (Must meet criteria for severity in at least 2 of these categories: M Wasting, Fat Loss, Fluid, Secondary Signs, Wt. Status, Intake)    Patient meets criteria for   Severe Malnutrition               Electronically signed by:  Irma Long RD  08/23/21 13:33 EDT

## 2021-08-23 NOTE — CASE MANAGEMENT/SOCIAL WORK
Discharge Planning Assessment  [facility]     Patient Name: Tariq Dominique  MRN: [id number]  Today's Date: 8/23/2021    Admit Date: 8/21/2021    Discharge Needs Assessment     Row Name 08/23/21 9951       Living Environment    Lives With  sibling(s)    Name(s) of Who Lives With Patient  Sisters- Ignacio    Current Living Arrangements  home/apartment/condo    Primary Care Provided by  self    Provides Primary Care For  no one    Family Caregiver if Needed  sibling(s)    Quality of Family Relationships  supportive;involved;helpful    Able to Return to Prior Arrangements  yes       Resource/Environmental Concerns    Resource/Environmental Concerns  none    Transportation Concerns  car, none       Transition Planning    Patient/Family Anticipates Transition to  inpatient rehabilitation facility    Patient/Family Anticipated Services at Transition  rehabilitation services    Transportation Anticipated  health plan transportation       Discharge Needs Assessment    Readmission Within the Last 30 Days  no previous admission in last 30 days    Equipment Currently Used at Home  walker, standard;cane, straight;wheelchair;commode    Concerns to be Addressed  care coordination/care conferences;discharge planning    Anticipated Changes Related to Illness  none    Equipment Needed After Discharge  none    Discharge Facility/Level of Care Needs  nursing facility, skilled    Provided Post Acute Provider List?  N/A        Discharge Plan     Row Name 08/23/21 5391       Plan    Plan  DC Plan: [facility] Swing Bed Unit referral pending acceptance. Would require precert if not qualified for floor to SNF. No PASRR needed for Swing Bed.    Patient/Family in Agreement with Plan  yes    Plan Comments  CM contacted patient’s legal guardian, Vivian Dominique ([phone]) to discuss dc planning. Discussed IMM letter. PCP confirmed (Alejandra Whiting at [facility] in [place]), care team updated in EPIC to reflect  correct PCP. Discussed therapy recommendations of inpt rehab. She requested referral to swing bed unit at [facility]. CM contacted [facility] Swing [facility] , Diane. Left VM and sent information in basket via fax in epic. DC Barriers: POD #1 hip hemiarthroplasty.        Continued Care and Services - Admitted Since 8/21/2021     Destination     Service Provider Request Status Selected Services Address Phone Fax Patient Preferred    [facility] OP  Pending - Request Sent N/A [address] [place] [address] [phone]                 Demographic Summary     Row Name 08/23/21 1549       General Information    Admission Type  inpatient    Reason for Consult  discharge planning    Preferred Language  English     Used During This Interaction  no       Contact Information    Permission Granted to Share Info With          Functional Status     Row Name 08/23/21 1549       Functional Status    Usual Activity Tolerance  poor    Current Activity Tolerance  poor       Functional Status, IADL    Medications  completely dependent    Meal Preparation  completely dependent    Housekeeping  completely dependent    Laundry  completely dependent    Shopping  completely dependent        LACE: 11    Phone communication or documentation only - no physical contact with patient or family.    Beverly Marc RN     Office Phone: [phone]  Office Cell: [phone]

## 2021-08-23 NOTE — THERAPY EVALUATION
Acute Care - Speech Language Pathology   Swallow Initial Evaluation [facility]     Patient Name: Tariq Dominique  : 1935  MRN: [id number]  Today's Date: 2021               Admit Date: 2021    Visit Dx:   No diagnosis found.  Patient Active Problem List   Diagnosis   • NSTEMI (non-ST elevated myocardial infarction) (CMS/Formerly [facility])   • Acute on chronic combined systolic and diastolic CHF (congestive heart failure) (CMS/Formerly [facility])   • Leukocytosis   • Hyperglycemia   • Severe malnutrition (CMS/Formerly [facility])   • COVID-19 virus detected   • Closed fracture of right femur, unspecified fracture morphology, initial encounter (CMS/[facility]ly [facility])   • Alzheimer's dementia without behavioral disturbance (CMS/[facility]ly [facility])     Past Medical History:   Diagnosis Date   • CHF (congestive heart failure) (CMS/Formerly [facility]) 2021   • Dementia due to Alzheimer's disease (CMS/Formerly [facility])    • Hypertension      History reviewed. No pertinent surgical history.    SLP Recommendation and Plan     SLP Diet Recommendation: NPO     SLP Rec. for Method of Medication Administration: meds via alternate route        Recommended Diagnostics: reassess via VFSS ([facility])           Therapy Frequency (Swallow): PRN  Predicted Duration Therapy Intervention (Days): until discharge             SWALLOW EVALUATION (last 72 hours)      SLP Adult Swallow Evaluation     Row Name 21 1500       Rehab Evaluation    Document Type  evaluation  -LF    Subjective Information  no complaints  -LF    Patient Observations  alert;cooperative  -LF    Care Plan Review  evaluation/treatment results reviewed;care plan/treatment goals reviewed;risks/benefits reviewed;current/potential barriers reviewed;patient/other agree to care plan  -LF    Patient Effort  good  -LF    Symptoms Noted During/After Treatment  none  -LF       General Information    Patient Profile Reviewed  yes  -LF    Pertinent History Of Current Problem  Mr. Dominique is an 87 y/o male who presents to [facility] after a fall at home and c/o right hip  "and right thigh pain. Pt was admitted and underwent right hip hemiarthroplasty on 8/22/21. PMH significant for dementia, acute CHF, and HTN. ST familiar with pt and last saw pt on  5/5/21 with rec of puree/thins. Pt on regular diet this admission and ST orders placed d/t \"choking on foods from regular diet.\"  -LF    Current Method of Nutrition  regular textures;thin liquids  -LF    Prior Level of Function-Swallowing  puree;thin liquids  -LF       Oral Motor Structure and Function    Dentition Assessment  edentulous  -LF    Secretion Management  WNL/WFL  -LF    Mucosal Quality  moist, healthy  -LF       Oral Musculature and Cranial Nerve Assessment    Oral Motor General Assessment  unable to assess;other (see comments) Pt difficulty following commands for OME  -LF       General Eating/Swallowing Observations    Respiratory Support Currently in Use  room air  -LF    Eating/Swallowing Skills  fed by SLP  -LF    Positioning During Eating  upright 90 degree;upright in bed  -LF    Utensils Used  spoon;straw  -LF    Consistencies Trialed  thin liquids;ice chips  -LF       Clinical Swallow Eval    Oral Prep Phase  WFL  -LF    Oral Transit  WFL  -LF    Pharyngeal Phase  suspected pharyngeal impairment  -LF    Clinical Swallow Evaluation Summary Upon entry, pt nonverbal with SLP but appropriately participated with all trials given. Pt fed by SLP and was given ice chips and boost by straw. Pt displayed adequate labial seal with spoon and straw and able to pull all trials with no anterior loss. Pt unable to pull from cup. Intermittent throat clearing with ice chips. Wet and gunky cough reaction observed with all trials of boost by straw. Suspect weak laryngeal elevation per palpation. Wet respiratory patterns noted following trials. No further trials given d/t s/s of difficulty and aspiration. Rec pt be NPO until pt can participate in a VFSS tomorrow AM. Discussed results and recs with pts RN  -LF       Pharyngeal Phase " Concerns    Pharyngeal Phase Concerns  cough  -LF    Cough  all consistencies  -LF       Recommendations    Therapy Frequency (Swallow)  PRN  -LF    Predicted Duration Therapy Intervention (Days)  until discharge  -    SLP Diet Recommendation  NPO  -LF    Recommended Diagnostics  reassess via VFSS ([facility])  -    Oral Care Recommendations  Oral Care BID/PRN  -LF    SLP Rec. for Method of Medication Administration  meds via alternate route  -LF       Swallow Goals (SLP)    Oral Nutrition/Hydration Goal Selection (SLP)  oral nutrition/hydration, SLP goal 1;oral nutrition/hydration, SLP goal 2  -LF       Oral Nutrition/Hydration Goal 1 (SLP)    Oral Nutrition/Hydration Goal 1, SLP  Pt will tolerate safest and L/R diet with no complications from aspiration  -LF    Time Frame (Oral Nutrition/Hydration Goal 1, SLP)  by discharge  -       Oral Nutrition/Hydration Goal 2 (SLP)    Oral Nutrition/Hydration Goal 2, SLP  Pt will participate in a VFSS to objectively assess swallow function and make further recs as indicated  -    Time Frame (Oral Nutrition/Hydration Goal 2, SLP)  1 day  -LF      User Key  (r) = Recorded By, (t) = Taken By, (c) = Cosigned By    Initials Name Effective Dates    LF Jenna Hart, WALDO 06/16/21 -           EDUCATION  The patient has been educated in the following areas:   Dysphagia (Swallowing Impairment) Oral Care/Hydration NPO rationale.       SLP GOALS     Row Name 08/23/21 1500       Oral Nutrition/Hydration Goal 1 (SLP)    Oral Nutrition/Hydration Goal 1, SLP  Pt will tolerate safest and L/R diet with no complications from aspiration  -LF    Time Frame (Oral Nutrition/Hydration Goal 1, SLP)  by discharge  -       Oral Nutrition/Hydration Goal 2 (SLP)    Oral Nutrition/Hydration Goal 2, SLP  Pt will participate in a VFSS to objectively assess swallow function and make further recs as indicated  -    Time Frame (Oral Nutrition/Hydration Goal 2, SLP)  1 day  -LF      User Key  (r) =  Recorded By, (t) = Taken By, (c) = Cosigned By    Initials Name Provider Type    Jenna Murillo, SLP Speech and Language Pathologist             Time Calculation:                WALDO Mccord  8/23/2021

## 2021-08-24 NOTE — CASE MANAGEMENT/SOCIAL WORK
Case Management Discharge Note      Received call from Diane at [facility] to notify that they are unable to accept patient to accommodate Alzheimer's needs. CM informed liaison that patient passed away this morning.    Selected Continued Care - Discharged on 2021 Admission date: 2021 - Discharge disposition:      Final Discharge Disposition Code: 41 -  in medical facility

## 2021-08-24 NOTE — NURSING NOTE
Yael Ross NP for Dr Bradford notified of patient death and order to release body. Dr Herrera answering service notified spoke to Kate. Dr Keith message left on answering service notified.

## 2021-08-24 NOTE — NURSING NOTE
Nurse aide did safety check on patient found patient unresponsive. Nurse found patient with no pulse and not breathing. Charge nurse called rapid response patient limited coded status.

## 2021-08-24 NOTE — DISCHARGE SUMMARY
[facility] Medicine Services  DISCHARGE SUMMARY    Patient Name: Tariq Dominique  : 1935  MRN: [id number]    Date of Admission: 2021  Date of Discharge: 2021  Primary Care Physician: Alejandra Whiting MD      Presenting Problem:   Closed right hip fracture, initial encounter (CMS/[facility]) [S72.001A]    Active and Resolved Hospital Problems:  Active Hospital Problems    Diagnosis POA   • **Closed fracture of right femur, unspecified fracture morphology, initial encounter (CMS/[facility]) [S72.91XA] Yes   • Alzheimer's dementia without behavioral disturbance (CMS/[facility]) [G30.9, F02.80] Yes   • Severe malnutrition (CMS/[facility]) [E43] Yes   • Acute on chronic combined systolic and diastolic CHF (congestive heart failure) (CMS/[facility]) [I50.43] Yes      Resolved Hospital Problems    Diagnosis POA   • Closed right hip fracture, initial encounter (CMS/[facility]) [S72.001A] Yes         Hospital Course     Hospital Course:  Chief Complaint: Thigh and hip pain     History of Present Illness: Tariq Dominique is a 86 y.o. male with a past medical history of acute on chronic combined systolic and diastolic CHF, advanced Alzheimer's dementia without behavioral disturbances, previous COVID-19 the patient, severe malnutrition, history of non-STEMI myocardial infarction who presented to [facility] on 2021 from an outlying facility after family brought patient in for evaluation after a fall at home and complaint of pain right hip right thigh.  Patient is very demented, he is alert but very disoriented, he is unable to describe pain, or verbalize increased or relief of pain.  He does state his hip hurts.  He denies any fever, cough, dizziness, chest pain, nausea vomiting diarrhea or abdominal pain.  Per family patient is DNR.  Hospitalist was consulted for medical management.  Orthopedic surgery to see tomorrow.    Hospital course and problem list    Right femur fracture  Orthopedic surgery on  board and patient underwent surgical correction with hip endoprosthesis  PT OT  Pain control     Acute kidney injury  With oliguria  Held ACE inhibitor,  Checked renal ultrasound which showed chronic renal disease no hydronephrosis  Patient was volume resuscitated with normal saline     CAD  Status post MI  Continued home meds  Cardiology was on board for preop clearance due to elevated troponin     BPH  Continue Flomax  Monitored for obstructive symptoms.  Serial bladder scans.  Were obtained     Hypertension  Held lisinopril due to SUGEY continued metoprolol     Troponin elevation  Flat  Cardiology on board  Continued CAD meds     Alzheimer's dementia  Follow-up with neurology outpatient     DVT PUD prophylaxis     Plan after the above hospitalization course patient was found to be unresponsive on the night of 2013 24 August on 2021.  Rapid response was called patient had no pulse and was not breathing patient was DNR/DNI family was informed and confirmed CODE STATUS.  Patient was pronounced dead.             Reasons For Change In Medications and Indications for New Medications:      Day of Discharge     Vital Signs:  Temp:  [97.9 °F (36.6 °C)-98.8 °F (37.1 °C)] 98.8 °F (37.1 °C)  Heart Rate:  [59-88] 86  Resp:  [16-17] 17  BP: (117-162)/(76-92) 162/92    Physical Exam:  Did not perform physical exam on the day of patient passing.    Physical exam from 8/23/2021  Constitutional: AO x0 awake no distress  HENT:   Head: Normocephalic and atraumatic.   Eyes: Conjunctivae and EOM are normal. Pupils are equal, round, and reactive to light.   Neck: No JVD present. No thyromegaly present.   Cardiovascular: Normal rate, regular rhythm, normal heart sounds and intact distal pulses. Exam reveals no gallop and no friction rub.   No murmur heard.  Pulmonary/Chest: Effort normal and breath sounds normal. No stridor. No respiratory distress.  has no wheezes.  has no rales.  exhibits no tenderness.   Abdominal: Soft. Bowel sounds  are normal.  no distension and no mass. There is no tenderness. There is no rebound and no guarding. No hernia.   Musculoskeletal: Decreased range of motion in the right hip, no edema in lower extremities  Lymphadenopathy:     no cervical adenopathy.   Neurological: Moving all extremities, AO x0 moving all extremities awake.  Skin: No rash noted.  not diaphoretic.   Psychiatric:   No distress unable to fully examine due to advanced dementia  Vitals reviewed.     Pertinent  and/or Most Recent Results     LAB RESULTS:      Lab 08/23/21 0348 08/21/21  2223   WBC 10.40 15.90*   HEMOGLOBIN 11.5* 11.9*   HEMATOCRIT 34.0* 34.9*   PLATELETS 109* 130*   NEUTROS ABS 8.00* 13.80*   LYMPHS ABS 1.40 1.10   MONOS ABS 0.90 0.90   EOS ABS 0.00 0.00   MCV 89.0 87.2   PROTIME 11.6 11.8*         Lab 08/23/21  0348 08/21/21  2223   SODIUM 141 137   POTASSIUM 5.1 4.3   CHLORIDE 107 101   CO2 23.0 26.0   ANION GAP 11.0 10.0   BUN 31* 20   CREATININE 1.46* 1.05   GLUCOSE 164* 162*   CALCIUM 8.1* 8.4*   MAGNESIUM 1.9 1.8   PHOSPHORUS 4.2 3.3             Lab 08/23/21  0348 08/22/21 2009 08/22/21 1752 08/21/21  2223   PROBNP  --   --   --  12,282.0*   TROPONIN T  --  0.065* 0.065* 0.026   PROTIME 11.6  --   --  11.8*   INR 1.05  --   --  1.07                 Lab 08/22/21  1628   PH, ARTERIAL 7.325*   PCO2, ARTERIAL 44.3   PO2 ART 79.9*   O2 SATURATION ART 94.7   FIO2 100   HCO3 ART 23.1   BASE EXCESS ART -3.0*     Brief Urine Lab Results  (Last result in the past 365 days)      Color   Clarity   Blood   Leuk Est   Nitrite   Protein   CREAT   Urine HCG        05/05/21 0936 Dark Yellow  Comment:  Result checked  Clear Negative Negative Positive Negative             Microbiology Results (last 10 days)     Procedure Component Value - Date/Time    COVID PRE-OP / PRE-PROCEDURE SCREENING ORDER (NO ISOLATION) - Swab, Nasopharynx [[id number]]  (Normal) Collected: 08/22/21 0733    Lab Status: Final result Specimen: Swab from Nasopharynx Updated:  08/22/21 0830    Narrative:      The following orders were created for panel order COVID PRE-OP / PRE-PROCEDURE SCREENING ORDER (NO ISOLATION) - Swab, Nasopharynx.  Procedure                               Abnormality         Status                     ---------                               -----------         ------                     COVID-19,CEPHEID/MAREN/BD...[[id number]]  Normal              Final result                 Please view results for these tests on the individual orders.    COVID-19,CEPHEID/MAREN/BDMAX,COR/MISTY/PAD/BRIANA IN-HOUSE(OR EMERGENT/ADD-ON),NP SWAB IN TRANSPORT MEDIA 3-4 HR TAT, RT-PCR - Swab, Nasopharynx [[id number]]  (Normal) Collected: 08/22/21 0733    Lab Status: Final result Specimen: Swab from Nasopharynx Updated: 08/22/21 0830     COVID19 Not Detected    Narrative:      Fact sheet for providers: https://www.fda.gov/media/285186/download     Fact sheet for patients: https://www.fda.gov/media/228403/download  Fact sheet for providers: https://www.fda.gov/media/271854/download     Fact sheet for patients: https://www.fda.gov/media/289823/download          XR Pelvis 1 or 2 View    Result Date: 8/22/2021  Impression:  1. Mildly displaced fracture of the right femoral neck. 2. Large colorectal stool burden, indicating constipation.  Electronically Signed By-Valdeamr Diane MD On:8/22/2021 9:39 AM This report was finalized on [id number] by  Valdemar Diane MD.    US Renal Bilateral    Result Date: 8/23/2021  Impression: Small bilateral kidneys with cortical thinning and increased cortical echotexture consistent with medical renal disease. There is no hydronephrosis.  Electronically Signed By-Sanchez Simpson MD On:8/23/2021 3:45 PM This report was finalized on [id number] by  Sanchez Simpson MD.    XR Hip With or Without Pelvis 2 - 3 View Right    Result Date: 8/22/2021  Impression: Right hip bipolar hemiarthroplasty placement with the hardware in its expected position.  Electronically Signed By-Valdemar  MD Roxi On:8/22/2021 6:01 PM This report was finalized on [id number] by  Valdemar Diane MD.              Results for orders placed during the hospital encounter of 05/01/21    Adult Transthoracic Echo Limited W/ Cont if Necessary Per Protocol    Interpretation Summary  · Left ventricular systolic function is severely decreased.  · Left ventricular ejection fraction is 35%  · Akinetic distal anterior wall apex and distal septum was noted  · Left ventricular wall thickness is consistent with borderline concentric hypertrophy.  · Left ventricular diastolic function was normal.      Labs Pending at Discharge:      Procedures Performed  Procedure(s):  HIP HEMIARTHROPLASTY         Consults:   Consults     Date and Time Order Name Status Description    8/22/2021 10:16 AM Inpatient Cardiology Consult Completed     8/21/2021  9:54 PM Inpatient Orthopedic Surgery Consult Completed             Discharge Details        Discharge Medications      ASK your doctor about these medications      Instructions Start Date   aspirin 81 MG chewable tablet   81 mg, Oral, Daily      clopidogrel 75 MG tablet  Commonly known as: PLAVIX   75 mg, Oral, Daily      furosemide 40 MG tablet  Commonly known as: LASIX   40 mg, Oral, Daily      isosorbide mononitrate 30 MG 24 hr tablet  Commonly known as: IMDUR   30 mg, Oral, Every 24 Hours Scheduled      lisinopril 40 MG tablet  Commonly known as: PRINIVIL,ZESTRIL   40 mg, Oral, Daily      metoprolol succinate XL 50 MG 24 hr tablet  Commonly known as: TOPROL-XL   50 mg, Oral, Every 24 Hours Scheduled      nitroglycerin 0.4 MG SL tablet  Commonly known as: NITROSTAT   0.4 mg, Sublingual, Every 5 Minutes PRN, Take no more than 3 doses in 15 minutes.      potassium chloride 20 MEQ CR tablet  Commonly known as: K-DUR,KLOR-CON   20 mEq, Oral, Daily      tamsulosin 0.4 MG capsule 24 hr capsule  Commonly known as: FLOMAX   0.4 mg, Oral, Daily             No Known Allergies      Discharge Disposition:  Patient passed away.      Diet:  Hospital:No active diet order        Discharge Activity:         CODE STATUS:  Code Status and Medical Interventions:   Ordered at: 21 2211     Limited Support to NOT Include:    Intubation    Cardioversion/Defibrillation     Level Of Support Discussed With:    Next of Kin (If No Surrogate)     Code Status:    No CPR     Medical Interventions (Level of Support Prior to Arrest):    Limited         No future appointments.        Time spent on Discharge including face to face service: 30 minutes minutes    Signature:   Electronically signed by Jenni Bradford MD, 21, 11:02 AM EDT.

## (undated) DEVICE — UNDRGLV SURG BIOGEL PIMICROINDICATOR SYNTH SZ8 LF STRL

## (undated) DEVICE — DRSNG GZ CURAD XEROFORM NONADHR OVERWRAP 5X9IN

## (undated) DEVICE — SUT VIC COAT 2 CP 27IN

## (undated) DEVICE — SPNG GZ AVANT 6PLY 4X4IN STRL PK/2

## (undated) DEVICE — SLV SCD CALF HEMOFORCE DVT THERP REPROC MD

## (undated) DEVICE — SOL IRR NACL 0.9PCT ARTHROMATIC 3000ML

## (undated) DEVICE — PENCL EVAC ULTRAVAC SMOKE W/BLD

## (undated) DEVICE — 3M™ STERI-DRAPE™ U-DRAPE 1015: Brand: STERI-DRAPE™

## (undated) DEVICE — BNDG ELAS CO-FLEX SLF ADHR 4IN5YD LF STRL

## (undated) DEVICE — SUT ETHIB 2 CV V37 MS/4 30IN MX69G

## (undated) DEVICE — SUT VIC 1 CTX 36IN J977H

## (undated) DEVICE — UNDERGLV SURG BIOGEL INDICAT PI SZ8 BLU

## (undated) DEVICE — 2108 SERIES SAGITTAL BLADE (24.8 X 1.24 X 73.8MM)

## (undated) DEVICE — PK TOTL HIP 50

## (undated) DEVICE — APPL CHLORAPREP HI/LITE TINTED 10.5ML ORNG

## (undated) DEVICE — GLV SURG SENSICARE PI ORTHO SZ8 LF STRL

## (undated) DEVICE — HIP PILLOW, ABDUCTION: Brand: DEROYAL

## (undated) DEVICE — SUT VIC 2/0 CT1 36IN

## (undated) DEVICE — KT SURG TURNOVER 050